# Patient Record
Sex: FEMALE | Race: OTHER | Employment: FULL TIME | URBAN - METROPOLITAN AREA
[De-identification: names, ages, dates, MRNs, and addresses within clinical notes are randomized per-mention and may not be internally consistent; named-entity substitution may affect disease eponyms.]

---

## 2017-09-13 PROBLEM — T74.22XA VICTIM OF CHILD MOLESTATION: Status: RESOLVED | Noted: 2017-09-13 | Resolved: 2017-09-13

## 2017-09-13 PROBLEM — T74.22XA VICTIM OF CHILD MOLESTATION: Status: ACTIVE | Noted: 2017-09-13

## 2020-12-23 PROBLEM — Z86.59 HISTORY OF BULIMIA: Status: ACTIVE | Noted: 2020-12-23

## 2020-12-23 PROBLEM — R87.619 ABNORMAL PAP SMEAR OF CERVIX: Status: ACTIVE | Noted: 2020-12-23

## 2020-12-23 PROBLEM — Z34.90 PREGNANCY: Status: ACTIVE | Noted: 2020-12-23

## 2020-12-23 PROBLEM — Z87.59 HISTORY OF DELIVERY OF MACROSOMAL INFANT: Status: ACTIVE | Noted: 2020-12-23

## 2020-12-23 PROBLEM — O98.319 GENITAL HERPES AFFECTING PREGNANCY: Status: ACTIVE | Noted: 2020-12-23

## 2020-12-23 PROBLEM — F32.A ANXIETY AND DEPRESSION: Status: ACTIVE | Noted: 2020-12-23

## 2020-12-23 PROBLEM — A60.09 GENITAL HERPES AFFECTING PREGNANCY: Status: ACTIVE | Noted: 2020-12-23

## 2020-12-23 PROBLEM — F41.9 ANXIETY AND DEPRESSION: Status: ACTIVE | Noted: 2020-12-23

## 2020-12-28 PROBLEM — O26.899 RH NEGATIVE STATE IN ANTEPARTUM PERIOD: Status: ACTIVE | Noted: 2020-12-28

## 2020-12-28 PROBLEM — Z67.91 RH NEGATIVE STATE IN ANTEPARTUM PERIOD: Status: ACTIVE | Noted: 2020-12-28

## 2021-03-18 PROBLEM — D21.9 FIBROID: Status: ACTIVE | Noted: 2021-03-18

## 2021-05-18 ENCOUNTER — HOSPITAL ENCOUNTER (OUTPATIENT)
Age: 36
Discharge: HOME | End: 2021-05-18
Attending: OBSTETRICS & GYNECOLOGY | Admitting: OBSTETRICS & GYNECOLOGY
Payer: COMMERCIAL

## 2021-05-18 VITALS
HEIGHT: 65 IN | TEMPERATURE: 97.6 F | HEART RATE: 75 BPM | OXYGEN SATURATION: 99 % | DIASTOLIC BLOOD PRESSURE: 76 MMHG | RESPIRATION RATE: 20 BRPM | BODY MASS INDEX: 38.15 KG/M2 | SYSTOLIC BLOOD PRESSURE: 133 MMHG | WEIGHT: 229 LBS

## 2021-05-18 PROBLEM — R10.9 ABDOMINAL PAIN DURING PREGNANCY IN THIRD TRIMESTER: Status: ACTIVE | Noted: 2021-05-18

## 2021-05-18 PROBLEM — O26.893 ABDOMINAL PAIN DURING PREGNANCY IN THIRD TRIMESTER: Status: ACTIVE | Noted: 2021-05-18

## 2021-05-18 LAB
ALBUMIN SERPL-MCNC: 2.9 G/DL (ref 3.5–5)
ALBUMIN/GLOB SERPL: 0.8 {RATIO} (ref 1.2–3.5)
ALP SERPL-CCNC: 90 U/L (ref 50–136)
ALT SERPL-CCNC: 16 U/L (ref 12–65)
AMYLASE SERPL-CCNC: 77 U/L (ref 25–115)
ANION GAP SERPL CALC-SCNC: 6 MMOL/L (ref 7–16)
AST SERPL-CCNC: 13 U/L (ref 15–37)
BASOPHILS # BLD: 0 K/UL (ref 0–0.2)
BASOPHILS NFR BLD: 0 % (ref 0–2)
BILIRUB SERPL-MCNC: 0.1 MG/DL (ref 0.2–1.1)
BUN SERPL-MCNC: 8 MG/DL (ref 6–23)
CALCIUM SERPL-MCNC: 8.6 MG/DL (ref 8.3–10.4)
CHLORIDE SERPL-SCNC: 107 MMOL/L (ref 98–107)
CO2 SERPL-SCNC: 24 MMOL/L (ref 21–32)
CREAT SERPL-MCNC: 0.34 MG/DL (ref 0.6–1)
DIFFERENTIAL METHOD BLD: ABNORMAL
EOSINOPHIL # BLD: 0 K/UL (ref 0–0.8)
EOSINOPHIL NFR BLD: 0 % (ref 0.5–7.8)
ERYTHROCYTE [DISTWIDTH] IN BLOOD BY AUTOMATED COUNT: 12.7 % (ref 11.9–14.6)
GLOBULIN SER CALC-MCNC: 3.7 G/DL (ref 2.3–3.5)
GLUCOSE SERPL-MCNC: 84 MG/DL (ref 65–100)
HCT VFR BLD AUTO: 34.2 % (ref 35.8–46.3)
HGB BLD-MCNC: 11.2 G/DL (ref 11.7–15.4)
IMM GRANULOCYTES # BLD AUTO: 0.1 K/UL (ref 0–0.5)
IMM GRANULOCYTES NFR BLD AUTO: 1 % (ref 0–5)
LDH SERPL L TO P-CCNC: 189 U/L (ref 100–190)
LYMPHOCYTES # BLD: 1.6 K/UL (ref 0.5–4.6)
LYMPHOCYTES NFR BLD: 22 % (ref 13–44)
MCH RBC QN AUTO: 31 PG (ref 26.1–32.9)
MCHC RBC AUTO-ENTMCNC: 32.7 G/DL (ref 31.4–35)
MCV RBC AUTO: 94.7 FL (ref 79.6–97.8)
MONOCYTES # BLD: 0.4 K/UL (ref 0.1–1.3)
MONOCYTES NFR BLD: 6 % (ref 4–12)
NEUTS SEG # BLD: 5.3 K/UL (ref 1.7–8.2)
NEUTS SEG NFR BLD: 72 % (ref 43–78)
NRBC # BLD: 0 K/UL (ref 0–0.2)
PLATELET # BLD AUTO: 199 K/UL (ref 150–450)
PMV BLD AUTO: 10.5 FL (ref 9.4–12.3)
POTASSIUM SERPL-SCNC: 4.4 MMOL/L (ref 3.5–5.1)
PROT SERPL-MCNC: 6.6 G/DL (ref 6.3–8.2)
RBC # BLD AUTO: 3.61 M/UL (ref 4.05–5.2)
SODIUM SERPL-SCNC: 137 MMOL/L (ref 136–145)
URATE SERPL-MCNC: 3.2 MG/DL (ref 2.6–6)
WBC # BLD AUTO: 7.4 K/UL (ref 4.3–11.1)

## 2021-05-18 PROCEDURE — 80053 COMPREHEN METABOLIC PANEL: CPT

## 2021-05-18 PROCEDURE — 84550 ASSAY OF BLOOD/URIC ACID: CPT

## 2021-05-18 PROCEDURE — 82150 ASSAY OF AMYLASE: CPT

## 2021-05-18 PROCEDURE — 83615 LACTATE (LD) (LDH) ENZYME: CPT

## 2021-05-18 PROCEDURE — 85025 COMPLETE CBC W/AUTO DIFF WBC: CPT

## 2021-05-18 PROCEDURE — 99282 EMERGENCY DEPT VISIT SF MDM: CPT

## 2021-05-18 RX ORDER — ONDANSETRON 4 MG/1
4 TABLET, ORALLY DISINTEGRATING ORAL
Status: DISCONTINUED | OUTPATIENT
Start: 2021-05-18 | End: 2021-05-19 | Stop reason: HOSPADM
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
## 2021-05-19 NOTE — PROGRESS NOTES
Patient discharged home self care. Patient verbalizes understanding of discharge instructions. Patient ambulated out.

## 2021-05-19 NOTE — DISCHARGE INSTRUCTIONS
Patient Education        Belly Pain in Pregnancy: Care Instructions  Overview     When you're pregnant, any belly pain can be a worry. You may not want to call your doctor or midwife about every pain you have. But you don't want to miss something that is dangerous for you or your baby. Even if it feels familiar, belly pain can mean something new when you're pregnant. It's important to know when to call your doctor or midwife. It will also help to know how to care for yourself at home when your pain is not caused by anything harmful. · When belly pain is more severe or constant, see a doctor or midwife right away. · If you're sure your belly pain is a sign of labor, call your doctor or midwife. · When belly pain is brief, it's usually a normal part of pregnancy. It might be related to changes in the growing uterus. Or it could be the stretching of ligaments called round ligaments. These ligaments help support the uterus. Round ligament pain can be on either side of your belly. It can also be felt in your hips or groin. Follow-up care is a key part of your treatment and safety. Be sure to make and go to all appointments, and call your doctor if you are having problems. It's also a good idea to know your test results and keep a list of the medicines you take. How can you tell if belly pain is a sign of labor? When belly pain is caused by labor, it can feel like mild or menstrual-like cramps in your lower belly. These cramps are probably contractions. They can happen in your second or third trimester. You may also have:  · A steady, dull ache in your lower back, pelvis, or thighs. · A feeling of pressure in your pelvis or lower belly. · Changes in your vaginal discharge or a sudden release of fluid from the vagina. If you think you are in labor, call your doctor. How can you care for yourself at home? When belly pain is mild and is not a symptom of labor:  · Rest until you feel better.   · Take a warm bath.  · Think about what you drink and eat:  ? Drink plenty of fluids. Choose water and other caffeine-free clear liquids until you feel better. ? Try eating small, frequent meals. If your stomach is upset, try bland, low-fat foods like plain rice, broiled chicken, toast, and yogurt. · Think about how you move if you are having brief pains from stretching of the round ligaments. ? Try gentle stretching. ? Move a little more slowly when turning in bed or getting up from a chair, so those ligaments don't stretch quickly. ? Lean forward a bit if you think you are going to cough or sneeze. When should you call for help? Call 911 anytime you think you may need emergency care. For example, call if:    · You have sudden, severe pain in your belly.     · You have severe vaginal bleeding.     · You passed out (lost consciousness).     · You have a seizure. Call your doctor now or seek immediate medical care if:    · You have new or worse belly pain or cramping.     · You have any vaginal bleeding.     · You have a fever.     · You have symptoms of preeclampsia, such as:  ? Sudden swelling of your face, hands, or feet. ? New vision problems (such as dimness, blurring, or seeing spots). ? A severe headache.     · You think that you may be in labor. This means that you've had at least 8 contractions within 1 hour or at least 4 contractions within 20 minutes, even after you change your position and drink fluids.     · You have symptoms of a urinary tract infection. These may include:  ? Pain or burning when you urinate. ? A frequent need to urinate without being able to pass much urine. ? Pain in the flank, which is just below the rib cage and above the waist on either side of the back. ? Blood in your urine. Watch closely for changes in your health, and be sure to contact your doctor if you are worried about your or your baby's health. Where can you learn more?   Go to http://www.gray.com/  Enter B275 in the search box to learn more about \"Belly Pain in Pregnancy: Care Instructions. \"  Current as of: October 8, 2020               Content Version: 12.8  © 2006-2021 [employer]. Care instructions adapted under license by [employer] (which disclaims liability or warranty for this information). If you have questions about a medical condition or this instruction, always ask your healthcare professional. [address] any warranty or liability for your use of this information.

## 2021-05-27 PROBLEM — U07.1 COVID-19: Status: ACTIVE | Noted: 2021-05-27

## 2021-06-04 PROBLEM — O98.513 COVID-19 AFFECTING PREGNANCY IN THIRD TRIMESTER: Status: ACTIVE | Noted: 2021-05-27

## 2021-06-04 PROBLEM — O09.93 HIGH-RISK PREGNANCY IN THIRD TRIMESTER: Status: ACTIVE | Noted: 2020-12-23

## 2021-06-04 PROBLEM — O26.893 ABDOMINAL PAIN DURING PREGNANCY IN THIRD TRIMESTER: Status: RESOLVED | Noted: 2021-05-18 | Resolved: 2021-06-04

## 2021-06-04 PROBLEM — O99.213 OBESITY AFFECTING PREGNANCY IN THIRD TRIMESTER: Status: ACTIVE | Noted: 2021-06-04

## 2021-06-04 PROBLEM — R10.9 ABDOMINAL PAIN DURING PREGNANCY IN THIRD TRIMESTER: Status: RESOLVED | Noted: 2021-05-18 | Resolved: 2021-06-04

## 2021-06-04 PROBLEM — O99.343 ANXIETY DURING PREGNANCY, ANTEPARTUM, THIRD TRIMESTER: Status: ACTIVE | Noted: 2020-12-23

## 2021-06-04 PROBLEM — O09.893 HIGH RISK TEEN PREGNANCY IN THIRD TRIMESTER: Status: ACTIVE | Noted: 2020-12-23

## 2021-06-04 PROBLEM — Z3A.31 31 WEEKS GESTATION OF PREGNANCY: Status: ACTIVE | Noted: 2021-06-04

## 2021-06-29 PROBLEM — Z3A.31 31 WEEKS GESTATION OF PREGNANCY: Status: RESOLVED | Noted: 2021-06-04 | Resolved: 2021-06-29

## 2021-07-25 ENCOUNTER — ANESTHESIA EVENT (OUTPATIENT)
Dept: LABOR AND DELIVERY | Age: 36
End: 2021-07-25
Payer: COMMERCIAL

## 2021-07-26 ENCOUNTER — HOSPITAL ENCOUNTER (INPATIENT)
Age: 36
LOS: 2 days | Discharge: HOME | End: 2021-07-28
Attending: OBSTETRICS & GYNECOLOGY | Admitting: OBSTETRICS & GYNECOLOGY
Payer: COMMERCIAL

## 2021-07-26 ENCOUNTER — ANESTHESIA (OUTPATIENT)
Dept: LABOR AND DELIVERY | Age: 36
End: 2021-07-26
Payer: COMMERCIAL

## 2021-07-26 DIAGNOSIS — A60.09 GENITAL HERPES AFFECTING PREGNANCY IN THIRD TRIMESTER: ICD-10-CM

## 2021-07-26 DIAGNOSIS — Z3A.39 39 WEEKS GESTATION OF PREGNANCY: Primary | ICD-10-CM

## 2021-07-26 DIAGNOSIS — O98.313 GENITAL HERPES AFFECTING PREGNANCY IN THIRD TRIMESTER: ICD-10-CM

## 2021-07-26 LAB
ABO + RH BLD: NORMAL
BASE EXCESS BLD CALC-SCNC: 0.3 MMOL/L
BASE EXCESS BLD CALC-SCNC: 0.6 MMOL/L
BLOOD GROUP ANTIBODIES SERPL: NORMAL
ERYTHROCYTE [DISTWIDTH] IN BLOOD BY AUTOMATED COUNT: 12.7 % (ref 11.9–14.6)
HCO3 BLD-SCNC: 26.3 MMOL/L (ref 22–26)
HCO3 BLDV-SCNC: 24.2 MMOL/L (ref 23–28)
HCT VFR BLD AUTO: 36.8 % (ref 35.8–46.3)
HGB BLD-MCNC: 12.5 G/DL (ref 11.7–15.4)
MCH RBC QN AUTO: 30.9 PG (ref 26.1–32.9)
MCHC RBC AUTO-ENTMCNC: 34 G/DL (ref 31.4–35)
MCV RBC AUTO: 91.1 FL (ref 79.6–97.8)
NRBC # BLD: 0 K/UL (ref 0–0.2)
PCO2 BLDCO: 36 MMHG (ref 32–68)
PCO2 BLDCO: 45 MMHG (ref 32–68)
PH BLDCO: 7.37 [PH] (ref 7.15–7.38)
PH BLDCO: 7.44 [PH] (ref 7.15–7.38)
PLATELET # BLD AUTO: 197 K/UL (ref 150–450)
PMV BLD AUTO: 10.7 FL (ref 9.4–12.3)
PO2 BLDCO: 13 MMHG
PO2 BLDCO: 24 MMHG
RBC # BLD AUTO: 4.04 M/UL (ref 4.05–5.2)
SAO2 % BLD: 13.3 % (ref 95–98)
SAO2 % BLDV: 44 % (ref 65–88)
SERVICE CMNT-IMP: ABNORMAL
SERVICE CMNT-IMP: ABNORMAL
SPECIMEN EXP DATE BLD: NORMAL
SPECIMEN TYPE: ABNORMAL
SPECIMEN TYPE: ABNORMAL
WBC # BLD AUTO: 6.8 K/UL (ref 4.3–11.1)

## 2021-07-26 PROCEDURE — 74011250637 HC RX REV CODE- 250/637: Performed by: STUDENT IN AN ORGANIZED HEALTH CARE EDUCATION/TRAINING PROGRAM

## 2021-07-26 PROCEDURE — 74011250636 HC RX REV CODE- 250/636: Performed by: PHYSICIAN ASSISTANT

## 2021-07-26 PROCEDURE — 74011250636 HC RX REV CODE- 250/636: Performed by: OBSTETRICS & GYNECOLOGY

## 2021-07-26 PROCEDURE — 74011250636 HC RX REV CODE- 250/636: Performed by: STUDENT IN AN ORGANIZED HEALTH CARE EDUCATION/TRAINING PROGRAM

## 2021-07-26 PROCEDURE — 76060000078 HC EPIDURAL ANESTHESIA: Performed by: OBSTETRICS & GYNECOLOGY

## 2021-07-26 PROCEDURE — 74011000250 HC RX REV CODE- 250: Performed by: PHYSICIAN ASSISTANT

## 2021-07-26 PROCEDURE — 77030034696 HC CATH URETH FOL 2W BARD -A: Performed by: OBSTETRICS & GYNECOLOGY

## 2021-07-26 PROCEDURE — 76010000391 HC C SECN FIRST 1 HR: Performed by: OBSTETRICS & GYNECOLOGY

## 2021-07-26 PROCEDURE — 77030032490 HC SLV COMPR SCD KNE COVD -B: Performed by: OBSTETRICS & GYNECOLOGY

## 2021-07-26 PROCEDURE — 75410000003 HC RECOV DEL/VAG/CSECN EA 0.5 HR: Performed by: OBSTETRICS & GYNECOLOGY

## 2021-07-26 PROCEDURE — 77030007880 HC KT SPN EPDRL BBMI -B: Performed by: NURSE ANESTHETIST, CERTIFIED REGISTERED

## 2021-07-26 PROCEDURE — 85027 COMPLETE CBC AUTOMATED: CPT

## 2021-07-26 PROCEDURE — 77030002888 HC SUT CHRMC J&J -A: Performed by: OBSTETRICS & GYNECOLOGY

## 2021-07-26 PROCEDURE — 74011000250 HC RX REV CODE- 250: Performed by: STUDENT IN AN ORGANIZED HEALTH CARE EDUCATION/TRAINING PROGRAM

## 2021-07-26 PROCEDURE — 2709999900 HC NON-CHARGEABLE SUPPLY: Performed by: OBSTETRICS & GYNECOLOGY

## 2021-07-26 PROCEDURE — 82803 BLOOD GASES ANY COMBINATION: CPT

## 2021-07-26 PROCEDURE — 65270000029 HC RM PRIVATE

## 2021-07-26 PROCEDURE — 74011000250 HC RX REV CODE- 250: Performed by: NURSE ANESTHETIST, CERTIFIED REGISTERED

## 2021-07-26 PROCEDURE — 77030003665 HC NDL SPN BBMI -A: Performed by: NURSE ANESTHETIST, CERTIFIED REGISTERED

## 2021-07-26 PROCEDURE — 59510 CESAREAN DELIVERY: CPT | Performed by: OBSTETRICS & GYNECOLOGY

## 2021-07-26 PROCEDURE — 74011250636 HC RX REV CODE- 250/636: Performed by: NURSE ANESTHETIST, CERTIFIED REGISTERED

## 2021-07-26 PROCEDURE — 86901 BLOOD TYPING SEROLOGIC RH(D): CPT

## 2021-07-26 PROCEDURE — 77030031139 HC SUT VCRL2 J&J -A: Performed by: OBSTETRICS & GYNECOLOGY

## 2021-07-26 PROCEDURE — 2709999900 HC NON-CHARGEABLE SUPPLY

## 2021-07-26 RX ORDER — DIPHENHYDRAMINE HYDROCHLORIDE 50 MG/ML
12.5 INJECTION, SOLUTION INTRAMUSCULAR; INTRAVENOUS
Status: DISCONTINUED | OUTPATIENT
Start: 2021-07-26 | End: 2021-07-27

## 2021-07-26 RX ORDER — SIMETHICONE 80 MG
80 TABLET,CHEWABLE ORAL
Status: DISCONTINUED | OUTPATIENT
Start: 2021-07-26 | End: 2021-07-28 | Stop reason: HOSPADM

## 2021-07-26 RX ORDER — SODIUM CHLORIDE, SODIUM LACTATE, POTASSIUM CHLORIDE, CALCIUM CHLORIDE 600; 310; 30; 20 MG/100ML; MG/100ML; MG/100ML; MG/100ML
150 INJECTION, SOLUTION INTRAVENOUS CONTINUOUS
Status: ACTIVE | OUTPATIENT
Start: 2021-07-26 | End: 2021-07-27

## 2021-07-26 RX ORDER — OXYTOCIN/RINGER'S LACTATE 30/500 ML
PLASTIC BAG, INJECTION (ML) INTRAVENOUS
Status: DISCONTINUED | OUTPATIENT
Start: 2021-07-26 | End: 2021-07-26 | Stop reason: HOSPADM

## 2021-07-26 RX ORDER — SODIUM CHLORIDE, SODIUM LACTATE, POTASSIUM CHLORIDE, CALCIUM CHLORIDE 600; 310; 30; 20 MG/100ML; MG/100ML; MG/100ML; MG/100ML
50 INJECTION, SOLUTION INTRAVENOUS CONTINUOUS
Status: DISCONTINUED | OUTPATIENT
Start: 2021-07-26 | End: 2021-07-27

## 2021-07-26 RX ORDER — NALOXONE HYDROCHLORIDE 0.4 MG/ML
0.2 INJECTION, SOLUTION INTRAMUSCULAR; INTRAVENOUS; SUBCUTANEOUS
Status: DISCONTINUED | OUTPATIENT
Start: 2021-07-26 | End: 2021-07-27

## 2021-07-26 RX ORDER — SODIUM CHLORIDE 0.9 % (FLUSH) 0.9 %
5-40 SYRINGE (ML) INJECTION AS NEEDED
Status: DISCONTINUED | OUTPATIENT
Start: 2021-07-26 | End: 2021-07-26 | Stop reason: HOSPADM

## 2021-07-26 RX ORDER — HYDROMORPHONE HYDROCHLORIDE 1 MG/ML
0.5 INJECTION, SOLUTION INTRAMUSCULAR; INTRAVENOUS; SUBCUTANEOUS
Status: DISCONTINUED | OUTPATIENT
Start: 2021-07-26 | End: 2021-07-27

## 2021-07-26 RX ORDER — KETOROLAC TROMETHAMINE 30 MG/ML
30 INJECTION, SOLUTION INTRAMUSCULAR; INTRAVENOUS
Status: DISCONTINUED | OUTPATIENT
Start: 2021-07-26 | End: 2021-07-27

## 2021-07-26 RX ORDER — ONDANSETRON 2 MG/ML
INJECTION INTRAMUSCULAR; INTRAVENOUS AS NEEDED
Status: DISCONTINUED | OUTPATIENT
Start: 2021-07-26 | End: 2021-07-26 | Stop reason: HOSPADM

## 2021-07-26 RX ORDER — SODIUM CHLORIDE, SODIUM LACTATE, POTASSIUM CHLORIDE, CALCIUM CHLORIDE 600; 310; 30; 20 MG/100ML; MG/100ML; MG/100ML; MG/100ML
INJECTION, SOLUTION INTRAVENOUS
Status: DISCONTINUED | OUTPATIENT
Start: 2021-07-26 | End: 2021-07-26 | Stop reason: HOSPADM

## 2021-07-26 RX ORDER — VALACYCLOVIR HYDROCHLORIDE 500 MG/1
500 TABLET, FILM COATED ORAL DAILY
Status: DISCONTINUED | OUTPATIENT
Start: 2021-07-27 | End: 2021-07-28 | Stop reason: HOSPADM

## 2021-07-26 RX ORDER — FAMOTIDINE 20 MG/1
20 TABLET, FILM COATED ORAL ONCE
Status: COMPLETED | OUTPATIENT
Start: 2021-07-26 | End: 2021-07-26

## 2021-07-26 RX ORDER — BUPIVACAINE HYDROCHLORIDE 7.5 MG/ML
INJECTION, SOLUTION INTRASPINAL
Status: COMPLETED | OUTPATIENT
Start: 2021-07-26 | End: 2021-07-26

## 2021-07-26 RX ORDER — TRISODIUM CITRATE DIHYDRATE AND CITRIC ACID MONOHYDRATE 500; 334 MG/5ML; MG/5ML
30 SOLUTION ORAL ONCE
Status: COMPLETED | OUTPATIENT
Start: 2021-07-26 | End: 2021-07-26

## 2021-07-26 RX ORDER — MORPHINE SULFATE 0.5 MG/ML
INJECTION, SOLUTION EPIDURAL; INTRATHECAL; INTRAVENOUS
Status: COMPLETED | OUTPATIENT
Start: 2021-07-26 | End: 2021-07-26

## 2021-07-26 RX ORDER — ONDANSETRON 2 MG/ML
4 INJECTION INTRAMUSCULAR; INTRAVENOUS ONCE
Status: COMPLETED | OUTPATIENT
Start: 2021-07-26 | End: 2021-07-26

## 2021-07-26 RX ORDER — SODIUM CHLORIDE 0.9 % (FLUSH) 0.9 %
5-40 SYRINGE (ML) INJECTION AS NEEDED
Status: DISCONTINUED | OUTPATIENT
Start: 2021-07-26 | End: 2021-07-28 | Stop reason: HOSPADM

## 2021-07-26 RX ORDER — SODIUM CHLORIDE, SODIUM LACTATE, POTASSIUM CHLORIDE, CALCIUM CHLORIDE 600; 310; 30; 20 MG/100ML; MG/100ML; MG/100ML; MG/100ML
50 INJECTION, SOLUTION INTRAVENOUS CONTINUOUS
Status: DISCONTINUED | OUTPATIENT
Start: 2021-07-26 | End: 2021-07-26 | Stop reason: HOSPADM

## 2021-07-26 RX ORDER — EPHEDRINE SULFATE/0.9% NACL/PF 50 MG/5 ML
SYRINGE (ML) INTRAVENOUS AS NEEDED
Status: DISCONTINUED | OUTPATIENT
Start: 2021-07-26 | End: 2021-07-26 | Stop reason: HOSPADM

## 2021-07-26 RX ORDER — KETOROLAC TROMETHAMINE 30 MG/ML
INJECTION, SOLUTION INTRAMUSCULAR; INTRAVENOUS AS NEEDED
Status: DISCONTINUED | OUTPATIENT
Start: 2021-07-26 | End: 2021-07-26 | Stop reason: HOSPADM

## 2021-07-26 RX ORDER — OXYCODONE HYDROCHLORIDE 5 MG/1
10 TABLET ORAL
Status: DISCONTINUED | OUTPATIENT
Start: 2021-07-26 | End: 2021-07-27

## 2021-07-26 RX ORDER — SERTRALINE HYDROCHLORIDE 50 MG/1
100 TABLET, FILM COATED ORAL DAILY
Status: DISCONTINUED | OUTPATIENT
Start: 2021-07-27 | End: 2021-07-28 | Stop reason: HOSPADM

## 2021-07-26 RX ORDER — SODIUM CHLORIDE 0.9 % (FLUSH) 0.9 %
5-40 SYRINGE (ML) INJECTION EVERY 8 HOURS
Status: DISCONTINUED | OUTPATIENT
Start: 2021-07-26 | End: 2021-07-27

## 2021-07-26 RX ORDER — CEFAZOLIN SODIUM/WATER 2 G/20 ML
2 SYRINGE (ML) INTRAVENOUS ONCE
Status: COMPLETED | OUTPATIENT
Start: 2021-07-26 | End: 2021-07-26

## 2021-07-26 RX ORDER — ACETAMINOPHEN 325 MG/1
650 TABLET ORAL EVERY 6 HOURS
Status: DISCONTINUED | OUTPATIENT
Start: 2021-07-26 | End: 2021-07-27

## 2021-07-26 RX ORDER — OXYTOCIN/RINGER'S LACTATE 30/500 ML
87.3 PLASTIC BAG, INJECTION (ML) INTRAVENOUS AS NEEDED
Status: DISCONTINUED | OUTPATIENT
Start: 2021-07-26 | End: 2021-07-26 | Stop reason: HOSPADM

## 2021-07-26 RX ORDER — DEXTROSE, SODIUM CHLORIDE, SODIUM LACTATE, POTASSIUM CHLORIDE, AND CALCIUM CHLORIDE 5; .6; .31; .03; .02 G/100ML; G/100ML; G/100ML; G/100ML; G/100ML
125 INJECTION, SOLUTION INTRAVENOUS CONTINUOUS
Status: DISCONTINUED | OUTPATIENT
Start: 2021-07-26 | End: 2021-07-26 | Stop reason: HOSPADM

## 2021-07-26 RX ORDER — SODIUM CHLORIDE 0.9 % (FLUSH) 0.9 %
5-40 SYRINGE (ML) INJECTION EVERY 8 HOURS
Status: DISCONTINUED | OUTPATIENT
Start: 2021-07-26 | End: 2021-07-26 | Stop reason: HOSPADM

## 2021-07-26 RX ORDER — OXYTOCIN/RINGER'S LACTATE 30/500 ML
10 PLASTIC BAG, INJECTION (ML) INTRAVENOUS AS NEEDED
Status: DISCONTINUED | OUTPATIENT
Start: 2021-07-26 | End: 2021-07-26 | Stop reason: HOSPADM

## 2021-07-26 RX ORDER — DOCUSATE SODIUM 100 MG/1
100 CAPSULE, LIQUID FILLED ORAL 2 TIMES DAILY
Status: DISCONTINUED | OUTPATIENT
Start: 2021-07-26 | End: 2021-07-28 | Stop reason: HOSPADM

## 2021-07-26 RX ADMIN — PHENYLEPHRINE HYDROCHLORIDE 100 MCG: 10 INJECTION INTRAVENOUS at 11:57

## 2021-07-26 RX ADMIN — BUPIVACAINE HYDROCHLORIDE IN DEXTROSE 12 MG: 7.5 INJECTION, SOLUTION SUBARACHNOID at 11:46

## 2021-07-26 RX ADMIN — MORPHINE SULFATE 0.15 MG: 0.5 INJECTION, SOLUTION EPIDURAL; INTRATHECAL; INTRAVENOUS at 11:46

## 2021-07-26 RX ADMIN — ONDANSETRON 4 MG: 2 INJECTION INTRAMUSCULAR; INTRAVENOUS at 12:02

## 2021-07-26 RX ADMIN — OXYTOCIN 500 ML/HR: 10 INJECTION, SOLUTION INTRAMUSCULAR; INTRAVENOUS at 12:02

## 2021-07-26 RX ADMIN — FAMOTIDINE 20 MG: 20 TABLET ORAL at 09:47

## 2021-07-26 RX ADMIN — SODIUM CITRATE AND CITRIC ACID MONOHYDRATE 30 ML: 500; 334 SOLUTION ORAL at 09:48

## 2021-07-26 RX ADMIN — Medication 10 MG: at 11:50

## 2021-07-26 RX ADMIN — PHENYLEPHRINE HYDROCHLORIDE 100 MCG: 10 INJECTION INTRAVENOUS at 11:50

## 2021-07-26 RX ADMIN — PROMETHAZINE HYDROCHLORIDE 12.5 MG: 25 INJECTION INTRAMUSCULAR; INTRAVENOUS at 15:03

## 2021-07-26 RX ADMIN — SODIUM CHLORIDE, SODIUM LACTATE, POTASSIUM CHLORIDE, AND CALCIUM CHLORIDE 50 ML/HR: 600; 310; 30; 20 INJECTION, SOLUTION INTRAVENOUS at 08:45

## 2021-07-26 RX ADMIN — HYDROMORPHONE HYDROCHLORIDE 0.5 MG: 1 INJECTION, SOLUTION INTRAMUSCULAR; INTRAVENOUS; SUBCUTANEOUS at 13:47

## 2021-07-26 RX ADMIN — SODIUM CHLORIDE, SODIUM LACTATE, POTASSIUM CHLORIDE, AND CALCIUM CHLORIDE 50 ML/HR: 600; 310; 30; 20 INJECTION, SOLUTION INTRAVENOUS at 11:35

## 2021-07-26 RX ADMIN — ACETAMINOPHEN 650 MG: 325 TABLET, FILM COATED ORAL at 21:31

## 2021-07-26 RX ADMIN — PROMETHAZINE HYDROCHLORIDE 6.25 MG: 25 INJECTION INTRAMUSCULAR; INTRAVENOUS at 16:52

## 2021-07-26 RX ADMIN — PROMETHAZINE HYDROCHLORIDE 6.25 MG: 25 INJECTION INTRAMUSCULAR; INTRAVENOUS at 16:34

## 2021-07-26 RX ADMIN — ONDANSETRON 4 MG: 2 INJECTION INTRAMUSCULAR; INTRAVENOUS at 13:47

## 2021-07-26 RX ADMIN — HYDROMORPHONE HYDROCHLORIDE 0.5 MG: 1 INJECTION, SOLUTION INTRAMUSCULAR; INTRAVENOUS; SUBCUTANEOUS at 13:38

## 2021-07-26 RX ADMIN — PHENYLEPHRINE HYDROCHLORIDE 50 MCG: 10 INJECTION INTRAVENOUS at 11:48

## 2021-07-26 RX ADMIN — KETOROLAC TROMETHAMINE 30 MG: 30 INJECTION, SOLUTION INTRAMUSCULAR; INTRAVENOUS at 12:16

## 2021-07-26 RX ADMIN — SODIUM CHLORIDE, SODIUM LACTATE, POTASSIUM CHLORIDE, AND CALCIUM CHLORIDE 50 ML/HR: 600; 310; 30; 20 INJECTION, SOLUTION INTRAVENOUS at 13:52

## 2021-07-26 RX ADMIN — KETOROLAC TROMETHAMINE 30 MG: 30 INJECTION, SOLUTION INTRAMUSCULAR; INTRAVENOUS at 18:33

## 2021-07-26 RX ADMIN — Medication 10 MG: at 11:48

## 2021-07-26 RX ADMIN — CEFAZOLIN 2 G: 1 INJECTION, POWDER, FOR SOLUTION INTRAVENOUS at 09:00

## 2021-07-26 RX ADMIN — SODIUM CHLORIDE, SODIUM LACTATE, POTASSIUM CHLORIDE, AND CALCIUM CHLORIDE: 600; 310; 30; 20 INJECTION, SOLUTION INTRAVENOUS at 11:38

## 2021-07-26 NOTE — LACTATION NOTE
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
## 2021-07-26 NOTE — OP NOTES
Yuridia Garrett  [id number]      INTRAUTERINE PREGNANCY  SECTION FULL OP NOTE        DATE OF PROCEDURE:  2021    PREOPERATIVE DIAGNOSIS:  Herpes simplex outbreak, active, 39 0/7 weeks estimated gestational age    POSTOPERATIVE DIAGNOSIS:  same with 6cm posterior lower uterine segment fibroid    ADDITIONAL DIAGNOSES: viable male infant    PROCEDURE: Low transverse  section    SURGEON:  Carl Moreno MD    ASSISTANT:  none    ANESTHESIA: Spinal    EBL: 101 cc    COMPLICATIONS: none    OPERATIVE PROCEDURE: Patient was placed on the operating room table in the supine position/left lateral tilt. Time out was done to confirm the operating procedure, surgeon, patient and site. Once confirmed by the team, procedure was started. After having adequate regional anesthesia by spinal injection, the patient was prepped and draped in the usual fashion for abdominal surgery. A Pfannenstiel incision was made and carried down sharply through the skin, subcutaneous tissue, and fascia. Fascia was sharply dissected free from underlying rectus muscles. The peritoneum was sharply entered and extended vertically. DeLee bladder blade was then placed over the bladder. The visceroperitoneal reflection over the lower uterine segment was incised transversely and the bladder flap sharply and bluntly developed. The uterus was incised transversely, then extended bluntly, and the infants head was delivered without difficulty and fundal pressure. Fluid was clear. Cord was clamped and cut. Mouth and nose were suctioned clean. The infant was given to the NICU personnel present at the time of delivery. The placenta was expressed, intact on inspection. The uterus was exteriorized, wrapped in a wet lap square, curetted with a dry square, and closed in a double-layered fashion with #1 chromic. Hemostasis appeared adequate. The cul-de-sac was then irrigated and suctioned clean. Fibroid noted in posterior lower uterine segment. The uterus was placed in the abdomen. The gutters were irrigated and suctioned clean. The peritoneum and rectus muscles were closed with 2-0 chromic. The fascia was closed with 0 vicryl. Running 2-0 plain was used to reapproximate the subcutaneous tissue. 4-0 Vicryl was used in a subcuticular stitch. The patient tolerated the procedure well and went to the recovery room in satisfactory condition.

## 2021-07-26 NOTE — ANESTHESIA PROCEDURE NOTES
Spinal Block    Start time: 7/26/2021 11:43 AM  End time: 7/26/2021 11:46 AM  Performed by: Sarah Henning MD  Authorized by: Sarah Henning MD     Pre-procedure:   Indications: primary anesthetic  Preanesthetic Checklist: patient identified, risks and benefits discussed, anesthesia consent, site marked, patient being monitored and timeout performed    Timeout Time: 11:43 EDT          Spinal Block:   Patient Position:  Seated  Prep Region:  Lumbar  Prep: chlorhexidine      Location:  L4-5  Technique:  Single shot    Local Dose (mL):  1.6    Needle:   Needle Type:  Pencan  Needle Gauge:  25 G  Attempts:  1      Events: CSF confirmed, no blood with aspiration and no paresthesia        Assessment:  Insertion:  Uncomplicated  Patient tolerance:  Patient tolerated the procedure well with no immediate complications

## 2021-07-26 NOTE — ANESTHESIA PREPROCEDURE EVALUATION
Relevant Problems   NEUROLOGY   (+) Anxiety during pregnancy, antepartum, third trimester      ENDOCRINE   (+) Obesity affecting pregnancy in third trimester       Anesthetic History   No history of anesthetic complications            Review of Systems / Medical History  Patient summary reviewed, nursing notes reviewed and pertinent labs reviewed    Pulmonary  Within defined limits                 Neuro/Psych         Psychiatric history     Cardiovascular  Within defined limits                Exercise tolerance: >4 METS     GI/Hepatic/Renal  Within defined limits              Endo/Other        Morbid obesity     Other Findings   Comments: HSV 2.  C/S for current outbreak           Physical Exam    Airway  Mallampati: II  TM Distance: 4 - 6 cm  Neck ROM: normal range of motion   Mouth opening: Normal     Cardiovascular  Regular rate and rhythm,  S1 and S2 normal,  no murmur, click, rub, or gallop             Dental  No notable dental hx       Pulmonary  Breath sounds clear to auscultation               Abdominal         Other Findings            Anesthetic Plan    ASA: 2  Anesthesia type: spinal      Post-op pain plan if not by surgeon: intrathecal opiates      Anesthetic plan and risks discussed with: Patient and Mother

## 2021-07-26 NOTE — PROGRESS NOTES
SBAR OUT Report: Mother    Verbal report given to Corrinne Bibles RN  on this patient, who is now being transferred to THE [facility] ([facility] for routine progression of care. The patient is not wearing a green \"Anesthesia-Duramorph\" band. Report consisted of patient's Situation, Background, Assessment and Recommendations (SBAR). [place] ID bands were compared with the identification form, and verified with the patient and receiving nurse. Information from the SBAR and the [address] [facility] Report was reviewed with the receiving nurse; opportunity for questions and clarification provided.

## 2021-07-26 NOTE — LACTATION NOTE
This note was copied from a baby's chart. In to see mom and infant for the first time. Experienced mom stated that she pumped with her first two children. She said that this infant latched and did nurse briefly. Mom very nauseous and has been vomiting. Reviewed the expectations of the first 24 hours. Set up pump at mom's bedside as she stated that she may pump. Informed he that the pump parts are hers to keep. Mom stated that she doesn't have a personal breast pump at home and will probably rent a pump at discharge. Lactation consultant will follow up tomorrow.

## 2021-07-26 NOTE — PROGRESS NOTES
Notified Dr. Moises Meadows of patient nausea and vomiting, notified that patient did get relief from phenergan that was given per order, give phenergan 6.25 mg IV now and may repeat phenergan 6.25mg  IV 20 minutes later if patient not to sleepy, read back

## 2021-07-26 NOTE — PROGRESS NOTES
Admission assessment complete as noted. Patient oriented to room and unit. Plan of care reviewed and patient verbalizes understanding. Questions encouraged and answered. Patent encouraged to call for needs or concerns. Safety Teaching reviewed with Mother:   1. Hand hygiene prior to handling the infant. 2. Use of bulb syringe. 3. Bracelets with matching numbers are placed on mother and infant  4. An infant security tag  [facility]) is placed on the infant's ankle and monitored  5. All OB nurses wear pink Employee badges - do not give your baby to anyone without proper identification. 6. Never leave the baby alone in the room. 7. The infant should be placed on their back to sleep. on a firm mattress. No toys should be placed in the crib. (safe sleep video offered to view)  8. Never shake the baby (video offered to view)  9. Infant fall prevention - do not sleep with the baby, and place the baby in the crib while ambulating. 8. Mother and Baby Care booklet given to Mother.

## 2021-07-26 NOTE — ANESTHESIA POSTPROCEDURE EVALUATION
Procedure(s):   SECTION.     spinal    Anesthesia Post Evaluation      Multimodal analgesia: multimodal analgesia used between 6 hours prior to anesthesia start to PACU discharge  Patient location during evaluation: bedside  Patient participation: complete - patient participated  Level of consciousness: awake and alert  Pain management: adequate  Airway patency: patent  Anesthetic complications: no  Cardiovascular status: acceptable  Respiratory status: acceptable  Hydration status: acceptable  Post anesthesia nausea and vomiting:  controlled  Final Post Anesthesia Temperature Assessment:  Normothermia (36.0-37.5 degrees C)      INITIAL Post-op Vital signs:   Vitals Value Taken Time   /75 21 1330   Temp 37 °C (98.6 °F) 21 1238   Pulse 58 21 1330   Resp 18 21 1330   SpO2 100 % 21 1330

## 2021-07-26 NOTE — L&D DELIVERY NOTE
Delivery Summary    Patient: Mayela Fajardo MRN: [id number]  SSN: [id number]    YOB: 1985  Age: 30 y.o. Sex: female        Information for the patient's :  Santi  [[id number]]       Labor Events:    Labor: No    Steroids:     Cervical Ripening Date/Time:       Cervical Ripening Type:     Antibiotics During Labor:     Rupture Identifier:      Rupture Date/Time: 2021 12:01 PM   Rupture Type:     Amniotic Fluid Volume: Moderate    Amniotic Fluid Description: Clear    Amniotic Fluid Odor:      Induction: None       Induction Date/Time:        Indications for Induction:      Augmentation: None   Augmentation Date/Time:      Indications for Augmentation:     Labor complications: None       Additional complications:        Delivery Events:  Indications For Episiotomy:     Episiotomy: None   Perineal Laceration(s):     Repaired:     Periurethral Laceration Location:      Repaired:     Labial Laceration Location:     Repaired:     Sulcal Laceration Location:     Repaired:     Vaginal Laceration Location:     Repaired:     Cervical Laceration Location:     Repaired:     Repair Suture:     Number of Repair Packets:     Estimated Blood Loss (ml):  ml   Quantitaive Blood Loss (ml):             Delivery Date: 2021    Delivery Time: 12:00 PM   Delivery Type: , Low Transverse     Details    Trial of Labor: No   Primary/Repeat: Primary   Priority: Routine   Indications: Other (Add Comments) active HSV outbreak     Sex:  Male     Gestational Age: 39w0d  Delivery Clinician:  Grazyna Sepulveda  Living Status: Living   Delivery Location: OR            APGARS  One minute Five minutes Ten minutes   Skin color: 0   1        Heart rate: 2   2        Grimace: 2   2        Muscle tone: 2   2        Breathin   2        Totals: 8   9          Presentation: Vertex    Position:        Resuscitation Method:  Suctioning-bulb; Tactile Stimulation Meconium Stained:        Cord Information: 3 Vessels  Complications: None;Nuchal Cord Without Compressions  Cord around: head  Delayed cord clamping? No  Cord clamped date/time:   Disposition of Cord Blood: Lab    Blood Gases Sent?: Yes    Placenta:  Date/Time: 2021 12:02 PM  Removal: Manual Removal      Appearance: Normal      Measurements:  Birth Weight:        Birth Length:        Head Circumference:        Chest Circumference:       Abdominal Girth:       Other Providers:   Amita FARRAR STEPHANIE;;;Caryn MARROQUIN DANIEL;KAI CISNEROS;;;Lani DELGADO ANGELA A;Hemanth MURO, Obstetrician;Primary Nurse;Primary [place] Nurse;Nicu Nurse;Neonatologist;Anesthesiologist;Crna;Nurse Practitioner;Midwife;Nursery Nurse;Respiratory Therapist;Pediatrician             Group B Strep:   Lab Results   Component Value Date/Time    Valentetreromeo, External positive 2009 12:00 AM     Information for the patient's :  Jameson Jimenez [[id number]]   No results found for: ABORH, PCTABR, PCTDIG, BILI, ABORHEXT, ABORH     Recent Labs     21  1212 21  1211   PCO2CB 36 45   PO2CB 24 13   HCO3I  --  26.3*   SO2I  --  13.3*   SPECTI VENOUS CORD ARTERIAL CORD   PHICB 7.44* 7.37

## 2021-07-27 LAB
BASOPHILS # BLD: 0 K/UL (ref 0–0.2)
BASOPHILS NFR BLD: 0 % (ref 0–2)
BLOOD BANK CMNT PATIENT-IMP: NORMAL
DIFFERENTIAL METHOD BLD: ABNORMAL
EOSINOPHIL # BLD: 0 K/UL (ref 0–0.8)
EOSINOPHIL NFR BLD: 0 % (ref 0.5–7.8)
ERYTHROCYTE [DISTWIDTH] IN BLOOD BY AUTOMATED COUNT: 13 % (ref 11.9–14.6)
FETAL SCREEN,FMHS: NORMAL
HCT VFR BLD AUTO: 32.4 % (ref 35.8–46.3)
HGB BLD-MCNC: 11 G/DL (ref 11.7–15.4)
IMM GRANULOCYTES # BLD AUTO: 0 K/UL (ref 0–0.5)
IMM GRANULOCYTES NFR BLD AUTO: 0 % (ref 0–5)
LYMPHOCYTES # BLD: 1.3 K/UL (ref 0.5–4.6)
LYMPHOCYTES NFR BLD: 16 % (ref 13–44)
MCH RBC QN AUTO: 31.3 PG (ref 26.1–32.9)
MCHC RBC AUTO-ENTMCNC: 34 G/DL (ref 31.4–35)
MCV RBC AUTO: 92.3 FL (ref 79.6–97.8)
MONOCYTES # BLD: 0.5 K/UL (ref 0.1–1.3)
MONOCYTES NFR BLD: 6 % (ref 4–12)
NEUTS SEG # BLD: 6.2 K/UL (ref 1.7–8.2)
NEUTS SEG NFR BLD: 77 % (ref 43–78)
NRBC # BLD: 0 K/UL (ref 0–0.2)
PLATELET # BLD AUTO: 167 K/UL (ref 150–450)
PMV BLD AUTO: 11.4 FL (ref 9.4–12.3)
RBC # BLD AUTO: 3.51 M/UL (ref 4.05–5.2)
WBC # BLD AUTO: 8 K/UL (ref 4.3–11.1)

## 2021-07-27 PROCEDURE — 85461 HEMOGLOBIN FETAL: CPT

## 2021-07-27 PROCEDURE — 36415 COLL VENOUS BLD VENIPUNCTURE: CPT

## 2021-07-27 PROCEDURE — 74011250636 HC RX REV CODE- 250/636: Performed by: OBSTETRICS & GYNECOLOGY

## 2021-07-27 PROCEDURE — 74011250637 HC RX REV CODE- 250/637: Performed by: STUDENT IN AN ORGANIZED HEALTH CARE EDUCATION/TRAINING PROGRAM

## 2021-07-27 PROCEDURE — 85025 COMPLETE CBC W/AUTO DIFF WBC: CPT

## 2021-07-27 PROCEDURE — 74011250636 HC RX REV CODE- 250/636: Performed by: STUDENT IN AN ORGANIZED HEALTH CARE EDUCATION/TRAINING PROGRAM

## 2021-07-27 PROCEDURE — 65270000029 HC RM PRIVATE

## 2021-07-27 PROCEDURE — 2709999900 HC NON-CHARGEABLE SUPPLY

## 2021-07-27 PROCEDURE — 74011250637 HC RX REV CODE- 250/637: Performed by: OBSTETRICS & GYNECOLOGY

## 2021-07-27 RX ORDER — OXYCODONE HYDROCHLORIDE 5 MG/1
5 TABLET ORAL
Status: DISCONTINUED | OUTPATIENT
Start: 2021-07-27 | End: 2021-07-28 | Stop reason: HOSPADM

## 2021-07-27 RX ORDER — IBUPROFEN 800 MG/1
800 TABLET ORAL EVERY 6 HOURS
Status: DISCONTINUED | OUTPATIENT
Start: 2021-07-27 | End: 2021-07-28 | Stop reason: HOSPADM

## 2021-07-27 RX ORDER — OXYCODONE AND ACETAMINOPHEN 7.5; 325 MG/1; MG/1
2 TABLET ORAL
Status: DISCONTINUED | OUTPATIENT
Start: 2021-07-27 | End: 2021-07-27

## 2021-07-27 RX ORDER — ZOLPIDEM TARTRATE 5 MG/1
5 TABLET ORAL
Status: DISCONTINUED | OUTPATIENT
Start: 2021-07-27 | End: 2021-07-28 | Stop reason: HOSPADM

## 2021-07-27 RX ORDER — NALOXONE HYDROCHLORIDE 0.4 MG/ML
0.4 INJECTION, SOLUTION INTRAMUSCULAR; INTRAVENOUS; SUBCUTANEOUS AS NEEDED
Status: DISCONTINUED | OUTPATIENT
Start: 2021-07-27 | End: 2021-07-28 | Stop reason: HOSPADM

## 2021-07-27 RX ORDER — IBUPROFEN 800 MG/1
800 TABLET ORAL
Status: DISCONTINUED | OUTPATIENT
Start: 2021-07-27 | End: 2021-07-27

## 2021-07-27 RX ORDER — DIPHENHYDRAMINE HCL 25 MG
25 CAPSULE ORAL
Status: DISCONTINUED | OUTPATIENT
Start: 2021-07-27 | End: 2021-07-28 | Stop reason: HOSPADM

## 2021-07-27 RX ORDER — ACETAMINOPHEN 500 MG
1000 TABLET ORAL
Status: DISCONTINUED | OUTPATIENT
Start: 2021-07-27 | End: 2021-07-28 | Stop reason: HOSPADM

## 2021-07-27 RX ORDER — OXYCODONE AND ACETAMINOPHEN 7.5; 325 MG/1; MG/1
1 TABLET ORAL
Status: DISCONTINUED | OUTPATIENT
Start: 2021-07-27 | End: 2021-07-27

## 2021-07-27 RX ORDER — OXYCODONE HYDROCHLORIDE 5 MG/1
10 TABLET ORAL
Status: DISCONTINUED | OUTPATIENT
Start: 2021-07-27 | End: 2021-07-28 | Stop reason: HOSPADM

## 2021-07-27 RX ADMIN — IBUPROFEN 800 MG: 800 TABLET, FILM COATED ORAL at 12:41

## 2021-07-27 RX ADMIN — DOCUSATE SODIUM 100 MG: 100 CAPSULE ORAL at 10:25

## 2021-07-27 RX ADMIN — SIMETHICONE 80 MG: 80 TABLET, CHEWABLE ORAL at 18:01

## 2021-07-27 RX ADMIN — SIMETHICONE 80 MG: 80 TABLET, CHEWABLE ORAL at 12:41

## 2021-07-27 RX ADMIN — ACETAMINOPHEN 650 MG: 325 TABLET, FILM COATED ORAL at 10:25

## 2021-07-27 RX ADMIN — ACETAMINOPHEN 650 MG: 325 TABLET, FILM COATED ORAL at 04:12

## 2021-07-27 RX ADMIN — VALACYCLOVIR HYDROCHLORIDE 500 MG: 500 TABLET, FILM COATED ORAL at 10:25

## 2021-07-27 RX ADMIN — DOCUSATE SODIUM 100 MG: 100 CAPSULE ORAL at 18:01

## 2021-07-27 RX ADMIN — SERTRALINE HYDROCHLORIDE 100 MG: 50 TABLET ORAL at 10:25

## 2021-07-27 RX ADMIN — OXYCODONE 5 MG: 5 TABLET ORAL at 18:31

## 2021-07-27 RX ADMIN — SIMETHICONE 80 MG: 80 TABLET, CHEWABLE ORAL at 10:25

## 2021-07-27 RX ADMIN — SIMETHICONE 80 MG: 80 TABLET, CHEWABLE ORAL at 21:46

## 2021-07-27 RX ADMIN — OXYCODONE 2.5 MG: 5 TABLET ORAL at 14:54

## 2021-07-27 RX ADMIN — IBUPROFEN 800 MG: 800 TABLET, FILM COATED ORAL at 18:02

## 2021-07-27 RX ADMIN — KETOROLAC TROMETHAMINE 30 MG: 30 INJECTION, SOLUTION INTRAMUSCULAR; INTRAVENOUS at 06:00

## 2021-07-27 RX ADMIN — SIMETHICONE 80 MG: 80 TABLET, CHEWABLE ORAL at 00:08

## 2021-07-27 RX ADMIN — KETOROLAC TROMETHAMINE 30 MG: 30 INJECTION, SOLUTION INTRAMUSCULAR; INTRAVENOUS at 00:08

## 2021-07-27 RX ADMIN — RHO(D) IMMUNE GLOBULIN (HUMAN) 0.3 MG: 1500 SOLUTION INTRAMUSCULAR at 10:25

## 2021-07-27 RX ADMIN — ACETAMINOPHEN 1000 MG: 500 TABLET, FILM COATED ORAL at 21:46

## 2021-07-27 NOTE — LACTATION NOTE
This note was copied from a baby's chart. Lactation visit. Mom with a lot of vomiting yesterday following csection but much better now. Mom sitting up in bed holding baby. Feeling much improved. Pump at bedside. Has not pumped yet. Has attempted latching a couple times, but no latch. Is bottle feeding formula. Discussed supply and demand. Recommended that mom begin pumping soon if she desires milk production. Discussed pumping every 3 hours, reviewed expected volumes with colostrum and milk to come soon with good stimulation, especially as mom has pumped previously. Mom states understanding, has used a hospital pump in the past. Offered help with pumping or latching if mom desires. Mom does plan to rent a pump for discharge.

## 2021-07-27 NOTE — PROGRESS NOTES
Chart reviewed - depression/anxiety. SW met with patient while social distancing w/appropriate PPE. Patient denies any history of postpartum depression/anxiety with her first two children. She does confirm a history of generalized depression/anxiety. She has been on Zoloft for the past 2-3 years and has found this medication beneficial.  Additionally, patient has a therapist that she sees regularly. Patient also has a strong/local support group. Patient given informational packet on  mood & anxiety disorders (resources/education). Family denies any additional needs from  at this time. Family has 's contact information should any needs/questions arise.     SEBLE Serrano  [facility]   [phone]

## 2021-07-27 NOTE — PROGRESS NOTES
Post-Operative Day Number 1 Progress Note    Patient doing well post-op day 1 from  delivery without significant complaints. Pain controlled on current medication. Voiding without difficulty, normal lochia. Vitals:    Patient Vitals for the past 8 hrs:   BP Temp Pulse Resp SpO2   21 0657 117/69 97.8 °F (36.6 °C) (!) 57 16 100 %   21 0412 (!) 112/55 97.7 °F (36.5 °C) (!) 58 16 98 %     Temp (24hrs), Av.9 °F (36.6 °C), Min:97.5 °F (36.4 °C), Max:98.6 °F (37 °C)      Vital signs stable, afebrile. Exam:  Patient without distress. Abdomen soft, fundus firm at level of umbilicus, non tender. Incision dry and clean without erythema. Lower extremities are negative for swelling, cords or tenderness. Lab/Data Review:  CBC:   Lab Results   Component Value Date/Time    WBC 8.0 2021 06:54 AM    HGB 11.0 (L) 2021 06:54 AM    HCT 32.4 (L) 2021 06:54 AM     2021 06:54 AM       Assessment and Plan:  Patient appears to be having uncomplicated post- course. Continue routine post-op care and maternal education.

## 2021-07-27 NOTE — PROGRESS NOTES
Notified Dr. Kala Waldrop of patient request to avoid narcotic if possible, discontinue percocet and change motrin 800 mg po to every 6 hours, tylenol 1000 mg every 6 hours prn, mild pain, oxycodone 5 mg po every 4 hours prn moderate pain, oxycodone 10 mg every 4 hours prn severe pain, read back

## 2021-07-27 NOTE — PROGRESS NOTES
Anesthesiology  Post-op Note    Post-op day 1 s/p  via spinal with neuraxial opioids for post-op pain management. Visit Vitals  BP (!) 112/55 (BP 1 Location: Right upper arm, BP Patient Position: At rest)   Pulse (!) 58   Temp 36.5 °C (97.7 °F)   Resp 16   Ht 5' 5\" (1.651 m)   Wt 108.4 kg (239 lb)   LMP 10/26/2020 (Exact Date)   SpO2 98%   Breastfeeding Yes   BMI 39.77 kg/m²     Airway patent, patient appropriately hydrated and appears euvolemic. Patient is Alert and oriented. Pain is well controlled. Pruritus is well controlled. Nausea is well controlled. No complaints about back or site of injection. Motor and sensory function has returned to baseline in lower extremities. Patient is satisfied with anesthetic and reports no complications. Continue current orders, then initiate surgeon's orders for pain management 24 hours after . Follow up per surgeon.

## 2021-07-27 NOTE — PROGRESS NOTES
Vigil d/c'd, IV capped, sequential device discontinued. Ambulated to restroom for teaching of sonny-care and pad change. Bed linen changed. Returned to bed safely. Tolerated without difficulty.

## 2021-07-28 VITALS
BODY MASS INDEX: 39.82 KG/M2 | WEIGHT: 239 LBS | SYSTOLIC BLOOD PRESSURE: 121 MMHG | RESPIRATION RATE: 18 BRPM | HEART RATE: 55 BPM | DIASTOLIC BLOOD PRESSURE: 71 MMHG | HEIGHT: 65 IN | OXYGEN SATURATION: 98 % | TEMPERATURE: 98 F

## 2021-07-28 PROCEDURE — 74011250636 HC RX REV CODE- 250/636: Performed by: OBSTETRICS & GYNECOLOGY

## 2021-07-28 PROCEDURE — 90715 TDAP VACCINE 7 YRS/> IM: CPT | Performed by: OBSTETRICS & GYNECOLOGY

## 2021-07-28 PROCEDURE — 74011250637 HC RX REV CODE- 250/637: Performed by: OBSTETRICS & GYNECOLOGY

## 2021-07-28 RX ORDER — OXYCODONE HYDROCHLORIDE 5 MG/1
5-10 TABLET ORAL
Qty: 15 TABLET | Refills: 0 | Status: SHIPPED | OUTPATIENT
Start: 2021-07-28 | End: 2021-07-31

## 2021-07-28 RX ORDER — ACETAMINOPHEN 500 MG
1000 TABLET ORAL
Qty: 30 TABLET | Refills: 0 | Status: SHIPPED | OUTPATIENT
Start: 2021-07-28

## 2021-07-28 RX ORDER — IBUPROFEN 800 MG/1
800 TABLET ORAL
Qty: 30 TABLET | Refills: 0 | Status: SHIPPED | OUTPATIENT
Start: 2021-07-28

## 2021-07-28 RX ADMIN — ACETAMINOPHEN 1000 MG: 500 TABLET, FILM COATED ORAL at 04:04

## 2021-07-28 RX ADMIN — TETANUS TOXOID, REDUCED DIPHTHERIA TOXOID AND ACELLULAR PERTUSSIS VACCINE, ADSORBED 0.5 ML: 5; 2.5; 8; 8; 2.5 SUSPENSION INTRAMUSCULAR at 09:46

## 2021-07-28 RX ADMIN — SIMETHICONE 80 MG: 80 TABLET, CHEWABLE ORAL at 09:06

## 2021-07-28 RX ADMIN — IBUPROFEN 800 MG: 800 TABLET, FILM COATED ORAL at 00:16

## 2021-07-28 RX ADMIN — VALACYCLOVIR HYDROCHLORIDE 500 MG: 500 TABLET, FILM COATED ORAL at 09:06

## 2021-07-28 RX ADMIN — SERTRALINE HYDROCHLORIDE 100 MG: 50 TABLET ORAL at 09:06

## 2021-07-28 RX ADMIN — IBUPROFEN 800 MG: 800 TABLET, FILM COATED ORAL at 06:26

## 2021-07-28 RX ADMIN — OXYCODONE 10 MG: 5 TABLET ORAL at 09:06

## 2021-07-28 RX ADMIN — DOCUSATE SODIUM 100 MG: 100 CAPSULE ORAL at 09:06

## 2021-07-28 NOTE — LACTATION NOTE
This note was copied from a baby's chart. Individualized Feeding Plan for Breastfeeding   Lactation Services [phone]      As much as possible, hold your baby on your chest so babys bare skin is against your bare skin with a blanket covering babys back, especially 30 minutes before it is time for baby to eat. Watch for early feeding cues such as, licking lips, sucking motions, rooting, hands to mouth. Crying is a late feeding cue. Feed your baby at least 8 times in 24 hours, or more if your baby is showing feeding cues. If baby is sleepy put baby skin to skin and watch for hunger cues. To rouse baby: unwrap, undress, massage hands, feet, & back, change diaper, gently change babys position from lying to sitting. 15-20 minutes on the first breast of active breastfeeding is considered a good feeding. Good, active breastfeeding is when baby is alert, tugging the nipple, their ear may move, and you can hear swallows. Allow baby to finish the first side before changing sides. Sleeping at the breast or only brief, light sucks should not be considered a good, full breastfeed. At each feedin.  Baby needs to NURSE WELL x 15-20 minutes on at least first breast, burp and offer 2nd breast at every feeding. If no sustained latch only attempt at breast for 10 minutes. If baby does not latch on and feed well on at least one side, you should:          2. Double pump for 15 minutes with breast massage and compression. Hand express for an additional 2-3 minutes per side. Pump after each feeding attempt or poor feeding, up to 8 times per day. If you are not putting baby to the breast you need to pump 8 times a day. Pump every 3 hours. 3. Give baby all of the breast milk you obtain using a straight syringe or  curved syringe.     If baby does NOT have enough wet and dirty diapers per day, is jaundiced/lethargic, or has significant weight loss AND you do NOT pump enough milk for each feeding (per volume listed below), formula supplementation may need to be used. Call lactation department /pediatrician if you have concerns. AVERAGE INTAKES OF COLOSTRUM BY HEALTHY  INFANTS:  Time  Day Intake (ml per feeding)  Based on 8 feedings per day. 1st 24 hrs  1 2-10 ml  24-48 hrs  2 5-15 ml  48-72 hrs  3 15-30 ml (0.5-1 oz)  72-96 hrs  4 30-45 ml (1-1.5oz)                          5-6      45-60 ml (1.5-2oz)                           7         80ml + more ad desired    By day 7, baby will need 80 ml or 2.6 oz at each feeding based on 8 feedings per day & babys weight. (1oz = 30ml). Total milk volume needed in 24 hours by Day 7 is 21 oz per day based on baby's birthweight of 7lb 13oz. The more often baby eats, the less volume they need per feeding. If baby is eating more often than the minimum of 8 times per day, they may take less per feeding. Comments: If pumping, suggest using olive oil or coconut oil on your nipples before pumping to help reduce the friction. Use feeding plan until follow up with pediatrician. Continue to attempt at the breast for most feeds. Pump every 3 hours if no latch. Give all pumped colostrum/breastmilk at each feeding. OUTPATIENT APPOINTMENT Suggested. Outpatient services are located on the 4th floor at [facility]. Check in at the 4th floor registration desk (the same one you used when you came to have your baby).   Call for questions [phone]

## 2021-07-28 NOTE — LACTATION NOTE

## 2021-07-28 NOTE — PROGRESS NOTES
Post-Partum Day Number 2 Progress Note    Patient doing well  without significant complaints. Pain controlled on current medication. Voiding without difficulty, normal lochia. Pt would like to go home today. Vitals:    Visit Vitals  /71 (BP 1 Location: Right arm, BP Patient Position: At rest)   Pulse (!) 55   Temp 98 °F (36.7 °C)   Resp 18   Ht 5' 5\" (1.651 m)   Wt 239 lb (108.4 kg)   LMP 10/26/2020 (Exact Date)   SpO2 98%   Breastfeeding Yes   BMI 39.77 kg/m²       Vital signs stable, afebrile. Exam:  Patient without distress. Heart rrr  Lungs cta b&s               Abdomen soft, fundus firm at level of umbilicus, nontender. Incision clean, dry and intact. Lower extremities are negative for swelling, cords or tenderness. Lab Results   Component Value Date/Time    ABO Group O 12/23/2020 01:55 PM    Rh (D) Negative 12/23/2020 01:55 PM    ABO/Rh(D) Charlene Peaks NEGATIVE 07/26/2021 08:52 AM        Lab Results   Component Value Date/Time    ABO/Rh(D) Charlene Peaks NEGATIVE 07/26/2021 08:52 AM    Antibody screen NEG 07/26/2021 08:52 AM    Antibody screen, External negative 12/23/2020 12:00 AM    Rubella, External immune 12/23/2020 12:00 AM    GrBStrep, External positive 08/26/2009 12:00 AM    ABO,Rh O Negative 12/23/2020 12:00 AM     Lab Results   Component Value Date/Time    HGB 11.0 (L) 07/27/2021 06:54 AM    Hgb, External 12.3 12/23/2020 12:00 AM         Assessment and Plan:  Patient appears to be having uncomplicated post-partum course. Continue routine post-delivery care and maternal education. Breastfeeding. Will discharge home.

## 2021-07-28 NOTE — DISCHARGE SUMMARY
Obstetrical Discharge Summary     Name: Melva Garland MRN: [id number]  SSN: [id number]    YOB: 1985  Age: 30 y.o. Sex: female      Allergies: Patient has no known allergies. Admit Date: 2021    Discharge Date: 2021     Admitting Physician: Carissa Metz MD     Attending Physician:  Vince Verdugo MD     * Admission Diagnoses: History of  [O05.613];05 weeks gestation of pregnancy [Z3A.39]    * Discharge Diagnoses:   Information for the patient's :  Angelika Mazariegos [[id number]]   Delivery of a   male infant via , Low Transverse on 2021 at 12:00 PM  by Miracle Perez. Apgars were 8  and 9 . Additional Diagnoses:   Hospital Problems as of 2021 Date Reviewed: 2021        Codes Class Noted - Resolved POA    39 weeks gestation of pregnancy ICD-10-CM: Z3A.39  ICD-9-CM: V22.2  2021 - Present Unknown        * (Principal) Genital herpes affecting pregnancy ICD-10-CM: O98.319, A60.09  ICD-9-CM: 647.60, 054.10  2020 - Present Yes    Overview Signed 2020  1:52 PM by Daria Rajput Valtrex PRN. Lab Results   Component Value Date/Time    ABO/Rh(D) O NEGATIVE 2021 08:52 AM    Rubella, External immune 2020 12:00 AM    GrBStrep, External positive 2009 12:00 AM    ABO,Rh O Negative 2020 12:00 AM      Immunization History   Administered Date(s) Administered    Rho(D) Immune Globulin - IM 2021, 2021    Rhogam Injection 2009       * Procedures:   Procedure(s):   SECTION           * Discharge Condition: good    * Hospital Course: Normal hospital course following the delivery. * Disposition: Home    Discharge Medications:   Current Discharge Medication List      START taking these medications    Details   acetaminophen (TYLENOL) 500 mg tablet Take 2 Tablets by mouth every six (6) hours as needed for Pain.   Qty: 30 Tablet, Refills: 0  Start date: 7/28/2021      ibuprofen (MOTRIN) 800 mg tablet Take 1 Tablet by mouth every eight (8) hours as needed for Pain. Qty: 30 Tablet, Refills: 0  Start date: 7/28/2021      oxyCODONE IR (ROXICODONE) 5 mg immediate release tablet Take 1-2 Tablets by mouth every four (4) hours as needed for Pain for up to 3 days. Max Daily Amount: 60 mg.  Qty: 15 Tablet, Refills: 0  Start date: 7/28/2021, End date: 7/31/2021    Associated Diagnoses: 39 weeks gestation of pregnancy         CONTINUE these medications which have NOT CHANGED    Details   valACYclovir (VALTREX) 500 mg tablet Take 1 Tablet by mouth daily. Qty: 90 Tablet, Refills: 3      sertraline (ZOLOFT) 100 mg tablet Take 1 Tab by mouth daily. Qty: 30 Tab, Refills: 11    Associated Diagnoses: Depression affecting pregnancy      prenatal vit 91/iron/folic/dha (PRENATAL + DHA PO) Take  by mouth. * Follow-up Care/Patient Instructions: Activity: No sex for 6 weeks  Diet: Regular Diet  Wound Care: As directed    Follow-up Information     Follow up With Specialties Details Why Contact Info    [facility], Not On File    Not On File (62) Patient has a PCP but that physician is not listed in [address] [facility].

## 2021-07-28 NOTE — DISCHARGE INSTRUCTIONS
Patient Education         Section: What to Expect at Home  Your Recovery     A  section, or , is surgery to deliver your baby through a cut that the doctor makes in your lower belly and uterus. The cut is called an incision. You may have some pain in your lower belly and need pain medicine for 1 to 2 weeks. You can expect some vaginal bleeding for several weeks. You will probably need about 6 weeks to fully recover. It's important to take it easy while the incision heals. Avoid heavy lifting, strenuous activities, and exercises that strain the belly muscles while you recover. Ask a family member or friend for help with housework, cooking, and shopping. This care sheet gives you a general idea about how long it will take for you to recover. But each person recovers at a different pace. Follow the steps below to get better as quickly as possible. How can you care for yourself at home? Activity    · Rest when you feel tired. Getting enough sleep will help you recover.     · Try to walk each day. Start by walking a little more than you did the day before. Bit by bit, increase the amount you walk. Walking boosts blood flow and helps prevent pneumonia, constipation, and blood clots.     · Avoid strenuous activities, such as bicycle riding, jogging, weightlifting, and aerobic exercise, for 6 weeks or until your doctor says it is okay.     · Until your doctor says it is okay, do not lift anything heavier than your baby.     · Do not do sit-ups or other exercises that strain the belly muscles for 6 weeks or until your doctor says it is okay.     · Hold a pillow over your incision when you cough or take deep breaths. This will support your belly and decrease your pain.     · You may shower as usual. Pat the incision dry when you are done.     · You will have some vaginal bleeding. Wear sanitary pads.  Do not douche or use tampons until your doctor says it is okay.     · Ask your doctor when you can drive again.     · You will probably need to take at least 6 weeks off work. It depends on the type of work you do and how you feel.     · Ask your doctor when it is okay for you to have sex. Diet    · You can eat your normal diet. If your stomach is upset, try bland, low-fat foods like plain rice, broiled chicken, toast, and yogurt.     · Drink plenty of fluids (unless your doctor tells you not to).     · You may notice that your bowel movements are not regular right after your surgery. This is common. Try to avoid constipation and straining with bowel movements. You may want to take a fiber supplement every day. If you have not had a bowel movement after a couple of days, ask your doctor about taking a mild laxative.     · If you are breastfeeding, limit alcohol. Alcohol can cause a lack of energy and other health problems for the baby when a breastfeeding woman drinks heavily. It can also get in the way of a mom's ability to feed her baby or to care for the child in other ways. There isn't a lot of research about exactly how much alcohol can harm a baby. Having no alcohol is the safest choice for your baby. If you choose to have a drink now and then, have only one drink, and limit the number of occasions that you have a drink. Wait to breastfeed at least 2 hours after you have a drink to reduce the amount of alcohol the baby may get in the milk. Medicines    · Your doctor will tell you if and when you can restart your medicines. He or she will also give you instructions about taking any new medicines.     · If you take aspirin or some other blood thinner, ask your doctor if and when to start taking it again. Make sure that you understand exactly what your doctor wants you to do.     · Take pain medicines exactly as directed. ? If the doctor gave you a prescription medicine for pain, take it as prescribed.   ? If you are not taking a prescription pain medicine, ask your doctor if you can take an over-the-counter medicine.     · If you think your pain medicine is making you sick to your stomach:  ? Take your medicine after meals (unless your doctor has told you not to). ? Ask your doctor for a different pain medicine.     · If your doctor prescribed antibiotics, take them as directed. Do not stop taking them just because you feel better. You need to take the full course of antibiotics. Incision care    · If you have strips of tape on the incision, leave the tape on for a week or until it falls off.     · Wash the area daily with warm, soapy water, and pat it dry. Don't use hydrogen peroxide or alcohol, which can slow healing. You may cover the area with a gauze bandage if it weeps or rubs against clothing. Change the bandage every day.     · Keep the area clean and dry. Other instructions    · If you breastfeed your baby, you may be more comfortable while you are healing if you place the baby so that he or she is not resting on your belly. Try tucking your baby under your arm, with his or her body along the side you will be feeding on. Support your baby's upper body with your arm. With that hand you can control your baby's head to bring his or her mouth to your breast. This is sometimes called the football hold. Follow-up care is a key part of your treatment and safety. Be sure to make and go to all appointments, and call your doctor if you are having problems. It's also a good idea to know your test results and keep a list of the medicines you take. When should you call for help? Call  911 anytime you think you may need emergency care. For example, call if:    · You have thoughts of harming yourself, your baby, or another person.     · You passed out (lost consciousness).     · You have chest pain, are short of breath, or cough up blood.     · You have a seizure.    Call your doctor now or seek immediate medical care if:    · You have pain that does not get better after you take pain medicine.     · You have severe vaginal bleeding.     · You are dizzy or lightheaded, or you feel like you may faint.     · You have new or worse pain in your belly or pelvis.     · You have loose stitches, or your incision comes open.     · You have symptoms of infection, such as:  ? Increased pain, swelling, warmth, or redness. ? Red streaks leading from the incision. ? Pus draining from the incision. ? A fever.     · You have symptoms of a blood clot in your leg (called a deep vein thrombosis), such as:  ? Pain in your calf, back of the knee, thigh, or groin. ? Redness and swelling in your leg or groin.     · You have signs of preeclampsia, such as:  ? Sudden swelling of your face, hands, or feet. ? New vision problems (such as dimness, blurring, or seeing spots). ? A severe headache. Watch closely for changes in your health, and be sure to contact your doctor if:    · You do not get better as expected. Where can you learn more? Go to http://www.arceo.com/  Enter M806 in the search box to learn more about \" Section: What to Expect at Home. \"  Current as of: 2020               Content Version: 12.8   [employer]. Care instructions adapted under license by [employer] (which disclaims liability or warranty for this information). If you have questions about a medical condition or this instruction, always ask your healthcare professional. [place] [address] any warranty or liability for your use of this information.

## 2022-03-18 PROBLEM — D21.9 FIBROID: Status: ACTIVE | Noted: 2021-03-18

## 2022-03-18 PROBLEM — Z86.59 HISTORY OF BULIMIA: Status: ACTIVE | Noted: 2020-12-23

## 2022-03-18 PROBLEM — O09.93 HIGH-RISK PREGNANCY IN THIRD TRIMESTER: Status: ACTIVE | Noted: 2020-12-23

## 2022-03-18 PROBLEM — O99.343 ANXIETY DURING PREGNANCY, ANTEPARTUM, THIRD TRIMESTER: Status: ACTIVE | Noted: 2020-12-23

## 2022-03-18 PROBLEM — F41.9 ANXIETY DURING PREGNANCY, ANTEPARTUM, THIRD TRIMESTER: Status: ACTIVE | Noted: 2020-12-23

## 2022-03-19 PROBLEM — O98.513 COVID-19 AFFECTING PREGNANCY IN THIRD TRIMESTER: Status: ACTIVE | Noted: 2021-05-27

## 2022-03-19 PROBLEM — Z87.59 HISTORY OF DELIVERY OF MACROSOMAL INFANT: Status: ACTIVE | Noted: 2020-12-23

## 2022-03-19 PROBLEM — O26.899 RH NEGATIVE STATE IN ANTEPARTUM PERIOD: Status: ACTIVE | Noted: 2020-12-28

## 2022-03-19 PROBLEM — U07.1 COVID-19 AFFECTING PREGNANCY IN THIRD TRIMESTER: Status: ACTIVE | Noted: 2021-05-27

## 2022-03-19 PROBLEM — Z67.91 RH NEGATIVE STATE IN ANTEPARTUM PERIOD: Status: ACTIVE | Noted: 2020-12-28

## 2022-03-19 PROBLEM — O98.319 GENITAL HERPES AFFECTING PREGNANCY: Status: ACTIVE | Noted: 2020-12-23

## 2022-03-19 PROBLEM — Z3A.39 39 WEEKS GESTATION OF PREGNANCY: Status: ACTIVE | Noted: 2021-07-26

## 2022-03-19 PROBLEM — R87.619 ABNORMAL PAP SMEAR OF CERVIX: Status: ACTIVE | Noted: 2020-12-23

## 2022-03-19 PROBLEM — A60.09 GENITAL HERPES AFFECTING PREGNANCY: Status: ACTIVE | Noted: 2020-12-23

## 2022-03-20 PROBLEM — O99.213 OBESITY AFFECTING PREGNANCY IN THIRD TRIMESTER: Status: ACTIVE | Noted: 2021-06-04

## 2024-01-08 ENCOUNTER — OFFICE VISIT (OUTPATIENT)
Dept: OBGYN CLINIC | Age: 39
End: 2024-01-08
Payer: COMMERCIAL

## 2024-01-08 VITALS
HEIGHT: 65 IN | SYSTOLIC BLOOD PRESSURE: 132 MMHG | DIASTOLIC BLOOD PRESSURE: 66 MMHG | BODY MASS INDEX: 34.81 KG/M2 | WEIGHT: 208.9 LBS

## 2024-01-08 DIAGNOSIS — Z01.419 WELL WOMAN EXAM: Primary | ICD-10-CM

## 2024-01-08 DIAGNOSIS — Z11.51 SCREENING FOR HUMAN PAPILLOMAVIRUS (HPV): ICD-10-CM

## 2024-01-08 DIAGNOSIS — Z13.89 SCREENING FOR GENITOURINARY CONDITION: ICD-10-CM

## 2024-01-08 DIAGNOSIS — Z12.4 SCREENING FOR CERVICAL CANCER: ICD-10-CM

## 2024-01-08 LAB
BILIRUBIN, URINE, POC: NEGATIVE
BLOOD URINE, POC: NORMAL
GLUCOSE URINE, POC: NEGATIVE
KETONES, URINE, POC: NEGATIVE
LEUKOCYTE ESTERASE, URINE, POC: NEGATIVE
NITRITE, URINE, POC: NEGATIVE
PH, URINE, POC: 7 (ref 4.6–8)
PROTEIN,URINE, POC: NEGATIVE
SPECIFIC GRAVITY, URINE, POC: 1.01 (ref 1–1.03)
URINALYSIS CLARITY, POC: CLEAR
URINALYSIS COLOR, POC: YELLOW
UROBILINOGEN, POC: NORMAL

## 2024-01-08 PROCEDURE — 81002 URINALYSIS NONAUTO W/O SCOPE: CPT | Performed by: OBSTETRICS & GYNECOLOGY

## 2024-01-08 PROCEDURE — 99395 PREV VISIT EST AGE 18-39: CPT | Performed by: OBSTETRICS & GYNECOLOGY

## 2024-01-08 SDOH — ECONOMIC STABILITY: FOOD INSECURITY: WITHIN THE PAST 12 MONTHS, YOU WORRIED THAT YOUR FOOD WOULD RUN OUT BEFORE YOU GOT MONEY TO BUY MORE.: PATIENT DECLINED

## 2024-01-08 SDOH — ECONOMIC STABILITY: FOOD INSECURITY: WITHIN THE PAST 12 MONTHS, THE FOOD YOU BOUGHT JUST DIDN'T LAST AND YOU DIDN'T HAVE MONEY TO GET MORE.: PATIENT DECLINED

## 2024-01-08 SDOH — ECONOMIC STABILITY: HOUSING INSECURITY
IN THE LAST 12 MONTHS, WAS THERE A TIME WHEN YOU DID NOT HAVE A STEADY PLACE TO SLEEP OR SLEPT IN A SHELTER (INCLUDING NOW)?: PATIENT DECLINED

## 2024-01-08 SDOH — ECONOMIC STABILITY: TRANSPORTATION INSECURITY
IN THE PAST 12 MONTHS, HAS LACK OF TRANSPORTATION KEPT YOU FROM MEETINGS, WORK, OR FROM GETTING THINGS NEEDED FOR DAILY LIVING?: PATIENT DECLINED

## 2024-01-08 SDOH — ECONOMIC STABILITY: INCOME INSECURITY: HOW HARD IS IT FOR YOU TO PAY FOR THE VERY BASICS LIKE FOOD, HOUSING, MEDICAL CARE, AND HEATING?: PATIENT DECLINED
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
## 2024-01-17 ENCOUNTER — TELEPHONE (OUTPATIENT)
Dept: OBGYN CLINIC | Age: 39
End: 2024-01-17

## 2024-01-17 LAB
COLLECTION METHOD: ABNORMAL
CYTOLOGIST CVX/VAG CYTO: ABNORMAL
CYTOLOGY CVX/VAG DOC THIN PREP: ABNORMAL
HPV APTIMA: POSITIVE
HPV GENOTYPE 18,45: POSITIVE
HPV, GENOTYPE 16: NEGATIVE
Lab: ABNORMAL
PAP SOURCE: ABNORMAL
PATH REPORT.FINAL DX SPEC: ABNORMAL
PATHOLOGIST CVX/VAG CYTO: ABNORMAL
PATHOLOGIST PROVIDED ICD: ABNORMAL
RECOM F/U CVX/VAG CYTO: ABNORMAL
STAT OF ADQ CVX/VAG CYTO-IMP: ABNORMAL

## 2024-01-17 NOTE — TELEPHONE ENCOUNTER
Phoned patient and reviewed pap smear results. Reviewed that pt needs colposcopy. Pt verbalized understanding. All questions answered.

## 2024-01-17 NOTE — TELEPHONE ENCOUNTER
----- Message from Shanda Sow RN sent at 1/17/2024  2:38 PM EST -----    ----- Message -----  From: Fely Marinelli MD  Sent: 1/17/2024  11:15 AM EST  To: Shanda Sow RN    NEEDS COLPO   ----- Message -----  From: Chica Zavaleta Incoming [place] [facility]  Sent: 1/17/2024  10:37 AM EST  To: Fely Marinelli MD

## 2024-01-24 NOTE — PROGRESS NOTES
The patient is a 38 y.o.  who is seen for Ultrasound secondary to enlarged uterus. Pt last seen 2024 for WWE.  Had complaints of urgency bloating and back pain-  HGSIL  HPV +  Scheduled for colposcopy  NEEDS EMB   Complains of weakness after period check cbc  US findings from today:  CX APPEARS WNL   UTERUS IS ANTEVERTED AND HETEROGENOUS WITH ONE POSTERIOR FIBROID NOTED MEASURING 7.9 X 6.5 X   8.5 CM (PREVIOUSLY 5.1 X 4.0 X 5.3 CM).   ENDO= 17.9 MM, AN ECHOGENIC MASS WITH FEEDER VESSEL IS SEEN WITHIN ENDO MEASURING 2.9 X 1.4 X 2.1   CM.   ROV WNL   LOV WNL   OVARIES VISUALIZED TA   NO ADNEXAL MASSES OR FLUID   Uterine volume:465.537    HISTORY:      Patient's last menstrual period was 2024 (exact date).    Current Outpatient Medications on File Prior to Visit   Medication Sig Dispense Refill    acetaminophen (TYLENOL) 500 MG tablet Take 2 tablets by mouth every 6 hours as needed      ibuprofen (ADVIL;MOTRIN) 800 MG tablet Take 1 tablet by mouth every 8 hours as needed       No current facility-administered medications on file prior to visit.       ROS:  Feeling well. No dyspnea or chest pain on exertion.  No abdominal pain, change in bowel habits, black or bloody stools.  No urinary tract symptoms. GYN ROS: no breast pain or new or enlarging lumps on self exam.    PHYSICAL EXAM:  Blood pressure (!) 142/90, height 1.651 m (5' 5\"), weight 95.3 kg (210 lb 3.2 oz), last menstrual period 2024.    The patient appears well, alert, oriented x 3, in no distress.  Deferred  D/c pt us findings significant growth of fibroid- posterior    ASSESSMENT:    1. Enlarged uterus   Large 9 cm posterior fiborid  HGSIL  Thick lining      PLAN:  Wants hysterecotmy  Will plan emb and colpo for hgsil  Will repeat bimanual exam to verify she is candidate for TLH  Concern with abnormal pap that we will be able to get entire cervix  Check CBC  cytotec    Schedule tlh bs may need to be ce bs depending on exam as

## 2024-01-29 ENCOUNTER — PROCEDURE VISIT (OUTPATIENT)
Dept: OBGYN CLINIC | Age: 39
End: 2024-01-29
Payer: COMMERCIAL

## 2024-01-29 ENCOUNTER — OFFICE VISIT (OUTPATIENT)
Dept: OBGYN CLINIC | Age: 39
End: 2024-01-29
Payer: COMMERCIAL

## 2024-01-29 VITALS
BODY MASS INDEX: 35.02 KG/M2 | DIASTOLIC BLOOD PRESSURE: 90 MMHG | WEIGHT: 210.2 LBS | HEIGHT: 65 IN | SYSTOLIC BLOOD PRESSURE: 142 MMHG

## 2024-01-29 DIAGNOSIS — Z13.0 SCREENING, ANEMIA, DEFICIENCY, IRON: ICD-10-CM

## 2024-01-29 DIAGNOSIS — R87.613 HIGH GRADE SQUAMOUS INTRAEPITHELIAL LESION (HGSIL) ON CYTOLOGIC SMEAR OF CERVIX: ICD-10-CM

## 2024-01-29 DIAGNOSIS — N85.2 ENLARGED UTERUS: ICD-10-CM

## 2024-01-29 DIAGNOSIS — D21.9 FIBROID: ICD-10-CM

## 2024-01-29 DIAGNOSIS — R93.89 THICKENED ENDOMETRIUM: ICD-10-CM

## 2024-01-29 DIAGNOSIS — N85.2 ENLARGED UTERUS: Primary | ICD-10-CM

## 2024-01-29 DIAGNOSIS — D21.9 FIBROID: Primary | ICD-10-CM

## 2024-01-29 LAB
ERYTHROCYTE [DISTWIDTH] IN BLOOD BY AUTOMATED COUNT: 12.1 % (ref 11.9–14.6)
HCT VFR BLD AUTO: 41 % (ref 35.8–46.3)
HGB BLD-MCNC: 13 G/DL (ref 11.7–15.4)
MCH RBC QN AUTO: 28.9 PG (ref 26.1–32.9)
MCHC RBC AUTO-ENTMCNC: 31.7 G/DL (ref 31.4–35)
MCV RBC AUTO: 91.1 FL (ref 82–102)
NRBC # BLD: 0 K/UL (ref 0–0.2)
PLATELET # BLD AUTO: 256 K/UL (ref 150–450)
PMV BLD AUTO: 11.8 FL (ref 9.4–12.3)
RBC # BLD AUTO: 4.5 M/UL (ref 4.05–5.2)
WBC # BLD AUTO: 4.9 K/UL (ref 4.3–11.1)

## 2024-01-29 PROCEDURE — 99215 OFFICE O/P EST HI 40 MIN: CPT | Performed by: OBSTETRICS & GYNECOLOGY

## 2024-01-29 PROCEDURE — 76856 US EXAM PELVIC COMPLETE: CPT | Performed by: OBSTETRICS & GYNECOLOGY

## 2024-01-29 RX ORDER — MISOPROSTOL 200 UG/1
TABLET ORAL
Qty: 1 TABLET | Refills: 0 | Status: SHIPPED | OUTPATIENT
Start: 2024-01-29

## 2024-02-21 ENCOUNTER — PROCEDURE VISIT (OUTPATIENT)
Dept: OBGYN CLINIC | Age: 39
End: 2024-02-21
Payer: COMMERCIAL

## 2024-02-21 VITALS
WEIGHT: 210.3 LBS | BODY MASS INDEX: 35.04 KG/M2 | HEIGHT: 65 IN | DIASTOLIC BLOOD PRESSURE: 74 MMHG | SYSTOLIC BLOOD PRESSURE: 118 MMHG

## 2024-02-21 DIAGNOSIS — R87.618 PAP SMEAR ABNORMALITY OF CERVIX/HUMAN PAPILLOMAVIRUS (HPV) POSITIVE: ICD-10-CM

## 2024-02-21 DIAGNOSIS — Z32.02 PREGNANCY EXAMINATION OR TEST, NEGATIVE RESULT: ICD-10-CM

## 2024-02-21 DIAGNOSIS — N92.0 MENORRHAGIA WITH REGULAR CYCLE: ICD-10-CM

## 2024-02-21 DIAGNOSIS — R87.613 HSIL (HIGH GRADE SQUAMOUS INTRAEPITHELIAL LESION) ON PAP SMEAR OF CERVIX: Primary | ICD-10-CM

## 2024-02-21 LAB
HCG, PREGNANCY, URINE, POC: NEGATIVE
VALID INTERNAL CONTROL, POC: YES

## 2024-02-21 PROCEDURE — 58110 BX DONE W/COLPOSCOPY ADD-ON: CPT | Performed by: OBSTETRICS & GYNECOLOGY

## 2024-02-21 PROCEDURE — 81025 URINE PREGNANCY TEST: CPT | Performed by: OBSTETRICS & GYNECOLOGY

## 2024-02-21 PROCEDURE — 57454 BX/CURETT OF CERVIX W/SCOPE: CPT | Performed by: OBSTETRICS & GYNECOLOGY

## 2024-02-21 NOTE — PROGRESS NOTES
Colposcopy Exam          Name:  Rahel Rangel    :  1985 Age:  38 y.o.    Indication for Colposcopy: 24 HSIL and HPV positive 16 Neg, 18/45: positive  Abnormal Pap and Colposcopy History:  21: ASCUS HPV: Positive  20: ASCUS HPV: Positive  3/8/19: ENDOCERVIX, CURETTINGS:   BENIGN ENDOCERVICAL TISSUE.   WES  19: ASCUS HPV: Positive   17: ASCUS HPV: Positive  Contraceptive method:  none    LMP:  Patient's last menstrual period was 2024 (exact date).  Tobacco Use:    Social History     Tobacco Use   Smoking Status Never   Smokeless Tobacco Never     Medications:    No current outpatient medications on file prior to visit.     No current facility-administered medications on file prior to visit.     Allergies:    Allergies as of 2024    (No Known Allergies)       uHCG:  negative    Abnormal Pap and Colposcopy History:  HGSIL hpv + 16.      Procedure:  The patient is counseled regarding the risks of coloposcopy, including bleeding, infection at the biopsy site or endometrium, and failure to identify the lesion.  Patient was placed in the lithotomy position.  The vulva was examined for suspicious lesions.  Subsequently a speculum was inserted and the cervix and vaginal wall were visualized.  3% Acetic acid was applied to the cervix using cotton swabs and the area was further visualized.  Abnormal areas were identified in the 12 oclock position of the cervix bright aceto white with irregular sharp borders of the cervix and biopsies were obtained. 12 0clock    Monsel's solution was used to control bleeding and good hemostasis was obtained.     Adequate Procedure: y  Pap Performed:  n  ECC Performed y  Additional lesion identified: n   Summary:  torito 3     ENDOMETRIAL BIOPSY    Date:  1985    Name:  Rahel Rangel     :  1985    Age:  38 y.o.    OB History          3    Para   3    Term   3            AB        Living   3

## 2024-02-22 NOTE — PROGRESS NOTES
Enhanced Recovery After GYN Surgery: non-diabetic patients    It is highly recommended you purchase and drink Ensure Complete - one bottle twice daily for five days starting on 03/15/24. Ensure Complete is the preferred formula over other Ensure formulas. It is recommended that you continue drinking this for one month after surgery.    The night before surgery 03/20/24, drink 2 bottles of the Ensure Pre-Surgery drink.     The morning of surgery 03/21/24, drink one bottle of the Ensure Pre-Surgery drink 2 hours prior to your arrival to the hospital. Drink this over 5-10 minutes.    Drink nothing else after drinking the pre-surgical drink the morning of surgery.    Bring your patient handbook with you to the hospital.    Things to remember:    1. You will be given clear liquids to drink, advancing diet as tolerated    2. You will be up and moving around with assistance 2-4 hours after surgery.    3. You will be given regularly scheduled pain medications (NSAIDS, Tylenol) with narcotics as needed.    4. You may be able to go home that night if the surgeon okays and you are up and eating and drinking. Otherwise, your discharge will be the following morning around lunch time.     5. Continue drinking Ensure Complete for 5 days after surgery.

## 2024-02-23 ENCOUNTER — TELEPHONE (OUTPATIENT)
Dept: OBGYN CLINIC | Age: 39
End: 2024-02-23

## 2024-02-23 NOTE — TELEPHONE ENCOUNTER
Phoned patient and notified of biopsy and emb results and ok to proceed with surgery. No further questions.

## 2024-02-23 NOTE — TELEPHONE ENCOUNTER
----- Message from Fely Marinelli MD sent at 2/23/2024 10:07 AM EST -----  Notify low grade dysplasia emb b9 ok to proceed with surgery  ----- Message -----  From: Chica Zavaleta Incoming [place] [employer]  Sent: 2/23/2024   9:31 AM EST  To: Fely Marinelli MD

## 2024-02-28 NOTE — PERIOP NOTE
Patient verified name and     Order for consent not found in EHR; patient verified.     Type 2 surgery    Labs per surgeon: No orders received.  Labs per anesthesia protocol: Hgb, T&S DOS; orders signed and held in EHR.   EKG: None per anesthesia protocol.       Patient informed of GYN class on 3/6/24 at which time labs will be drawn. Patient will also receive all patient education and if staying overnight will receive hospital approved surgical skin cleanser; if not, patient will use non-moisturizing soap.    Patient instructed to hold all vitamins 7 days prior to surgery and NSAIDS 5 days prior to surgery, patient verbalized understanding.    Patient instructed to continue previous medications as prescribed prior to surgery and to take the following medications the day of surgery according to anesthesia guidelines with a small sip of water: None.     Patient answered medical/surgical history questions at their best of ability. All prior to admission medications documented in [facility].

## 2024-02-28 NOTE — PERIOP NOTE
PLEASE CONTINUE TAKING ALL PRESCRIPTION MEDICATIONS UP TO THE DAY OF SURGERY UNLESS OTHERWISE DIRECTED BELOW.     DISCONTINUE all over the counter vitamins, supplements, and herbals 7 days prior to surgery. DISCONTINUE Non-Steroidal Anti-Inflammatory (NSAIDS) such as Advil, Ibuprofen, Motrin, Naproxen, and Aleve 5 days prior to surgery.      *IT IS OK TO TAKE TYLENOL, Allergy medication, and indigestion medications*     Prescription medications to HOLD               CONTINUE all prescriptions like normal up until the day of surgery--TAKE ONLY THE BELOW MEDICATIONS THE DAY OF SURGERY.    NONE          Comments   The day before surgery please take 2 Tylenol in the morning and then again before bed. You may use either regular or extra strength.           Please do not bring home medications with you on the day of surgery unless otherwise directed by your nurse.  If you are instructed to bring home medications, please give them to your nurse as they will be administered by the nursing staff.     If you have any questions, please call [facility] [phone].     Copy of above instructions will be given and reviewed with patient at GYN/ERAS class.

## 2024-03-06 ENCOUNTER — HOSPITAL ENCOUNTER (OUTPATIENT)
Dept: SURGERY | Age: 39
Discharge: HOME | End: 2024-03-09
Payer: COMMERCIAL

## 2024-03-06 DIAGNOSIS — Z01.818 PRE-OP TESTING: ICD-10-CM

## 2024-03-06 LAB — HGB BLD-MCNC: 12.7 G/DL (ref 11.7–15.4)

## 2024-03-06 PROCEDURE — 85018 HEMOGLOBIN: CPT

## 2024-03-06 PROCEDURE — 36415 COLL VENOUS BLD VENIPUNCTURE: CPT

## 2024-03-07 NOTE — PROGRESS NOTES
Preop Visit    Ms. Rangel presents for a preop visit.  She is scheduled for a  ERAS/ TOTAL ABDOMINAL HYSTERECTOMY/ BILATERAL SALPINGECTOMY .  Her history, meds, and allergies were reviewed.  The procedure was reviewed in detail as well as the risks of bleeding, infection, DVT and potential surgical complications involving the bladder, ureters, colon or intestines.  Also the alternatives,  benefits, recovery and follow-up. Prevention of SSI discussed as indicated.  All of her questions were answered.  All ?s answered plan abdominal sec to size and dysplasia  Exam:  Lungs CTAB  Heart RRR    See the hospital H&P as well as prior chart for details.    March 11, 2024 2/21/24 Surgical pathology    A:  \"CERVICAL BIOPSY 12 O'CLOCK\":  SQUAMOUS EPITHELIUM FOCALLY HAVING   CHANGES CONSISTENT WITH CERVICAL INTRAEPITHELIAL NEOPLASIA, I (MILD   DYSPLASIA).  TRANSFORMATION ZONE (ENDOCERVICAL COMPONENT) IS NOT   IDENTIFIED.        B:  \"ENDOMETRIAL BIOPSY\":  FRAGMENTS OF NONSECRETORY ENDOMETRIUM.

## 2024-03-11 ENCOUNTER — OFFICE VISIT (OUTPATIENT)
Dept: OBGYN CLINIC | Age: 39
End: 2024-03-11

## 2024-03-11 VITALS
DIASTOLIC BLOOD PRESSURE: 74 MMHG | WEIGHT: 208.5 LBS | BODY MASS INDEX: 34.74 KG/M2 | SYSTOLIC BLOOD PRESSURE: 118 MMHG | HEIGHT: 65 IN

## 2024-03-11 DIAGNOSIS — Z01.818 PREOP EXAMINATION: Primary | ICD-10-CM

## 2024-03-11 PROCEDURE — PREOPEXAM PRE-OP EXAM: Performed by: OBSTETRICS & GYNECOLOGY

## 2024-03-11 RX ORDER — SODIUM CHLORIDE 0.9 % (FLUSH) 0.9 %
5-40 SYRINGE (ML) INJECTION PRN
Status: CANCELLED | OUTPATIENT
Start: 2024-03-11

## 2024-03-11 RX ORDER — SODIUM CHLORIDE 0.9 % (FLUSH) 0.9 %
5-40 SYRINGE (ML) INJECTION EVERY 12 HOURS SCHEDULED
Status: CANCELLED | OUTPATIENT
Start: 2024-03-11

## 2024-03-11 RX ORDER — SODIUM CHLORIDE 9 MG/ML
INJECTION, SOLUTION INTRAVENOUS PRN
Status: CANCELLED | OUTPATIENT
Start: 2024-03-11
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
## 2024-03-12 ENCOUNTER — ANESTHESIA EVENT (OUTPATIENT)
Dept: SURGERY | Age: 39
End: 2024-03-12
Payer: COMMERCIAL

## 2024-03-21 ENCOUNTER — HOSPITAL ENCOUNTER (INPATIENT)
Age: 39
LOS: 1 days | Discharge: HOME | End: 2024-03-22
Attending: OBSTETRICS & GYNECOLOGY | Admitting: OBSTETRICS & GYNECOLOGY
Payer: COMMERCIAL

## 2024-03-21 ENCOUNTER — ANESTHESIA (OUTPATIENT)
Dept: SURGERY | Age: 39
End: 2024-03-21
Payer: COMMERCIAL

## 2024-03-21 ENCOUNTER — PREP FOR PROCEDURE (OUTPATIENT)
Dept: OBGYN CLINIC | Age: 39
End: 2024-03-21

## 2024-03-21 DIAGNOSIS — D21.9 FIBROIDS: ICD-10-CM

## 2024-03-21 DIAGNOSIS — N85.2 ENLARGED UTERUS: ICD-10-CM

## 2024-03-21 DIAGNOSIS — Z01.818 PRE-OP TESTING: ICD-10-CM

## 2024-03-21 DIAGNOSIS — R93.89 THICKENED ENDOMETRIUM: ICD-10-CM

## 2024-03-21 DIAGNOSIS — D21.9 FIBROID: Primary | ICD-10-CM

## 2024-03-21 LAB
ABO + RH BLD: NORMAL
BLOOD GROUP ANTIBODIES SERPL: NORMAL
HCG UR QL: NEGATIVE
SPECIMEN EXP DATE BLD: NORMAL

## 2024-03-21 PROCEDURE — 7100000001 HC PACU RECOVERY - ADDTL 15 MIN: Performed by: OBSTETRICS & GYNECOLOGY

## 2024-03-21 PROCEDURE — 86850 RBC ANTIBODY SCREEN: CPT

## 2024-03-21 PROCEDURE — 2580000003 HC RX 258: Performed by: OBSTETRICS & GYNECOLOGY

## 2024-03-21 PROCEDURE — 81025 URINE PREGNANCY TEST: CPT

## 2024-03-21 PROCEDURE — 6370000000 HC RX 637 (ALT 250 FOR IP): Performed by: ANESTHESIOLOGY

## 2024-03-21 PROCEDURE — 0UT70ZZ RESECTION OF BILATERAL FALLOPIAN TUBES, OPEN APPROACH: ICD-10-PCS | Performed by: OBSTETRICS & GYNECOLOGY

## 2024-03-21 PROCEDURE — 3700000001 HC ADD 15 MINUTES (ANESTHESIA): Performed by: OBSTETRICS & GYNECOLOGY

## 2024-03-21 PROCEDURE — 2580000003 HC RX 258: Performed by: ANESTHESIOLOGY

## 2024-03-21 PROCEDURE — 64488 TAP BLOCK BI INJECTION: CPT | Performed by: ANESTHESIOLOGY

## 2024-03-21 PROCEDURE — 6360000002 HC RX W HCPCS: Performed by: NURSE ANESTHETIST, CERTIFIED REGISTERED

## 2024-03-21 PROCEDURE — 2500000003 HC RX 250 WO HCPCS: Performed by: NURSE ANESTHETIST, CERTIFIED REGISTERED

## 2024-03-21 PROCEDURE — 88307 TISSUE EXAM BY PATHOLOGIST: CPT

## 2024-03-21 PROCEDURE — 0UT20ZZ RESECTION OF BILATERAL OVARIES, OPEN APPROACH: ICD-10-PCS | Performed by: OBSTETRICS & GYNECOLOGY

## 2024-03-21 PROCEDURE — 3700000000 HC ANESTHESIA ATTENDED CARE: Performed by: OBSTETRICS & GYNECOLOGY

## 2024-03-21 PROCEDURE — 6370000000 HC RX 637 (ALT 250 FOR IP): Performed by: OBSTETRICS & GYNECOLOGY

## 2024-03-21 PROCEDURE — 2709999900 HC NON-CHARGEABLE SUPPLY: Performed by: OBSTETRICS & GYNECOLOGY

## 2024-03-21 PROCEDURE — 6360000002 HC RX W HCPCS: Performed by: ANESTHESIOLOGY

## 2024-03-21 PROCEDURE — 6360000002 HC RX W HCPCS: Performed by: OBSTETRICS & GYNECOLOGY

## 2024-03-21 PROCEDURE — 2720000010 HC SURG SUPPLY STERILE: Performed by: OBSTETRICS & GYNECOLOGY

## 2024-03-21 PROCEDURE — 58150 TOTAL HYSTERECTOMY: CPT | Performed by: OBSTETRICS & GYNECOLOGY

## 2024-03-21 PROCEDURE — 2500000003 HC RX 250 WO HCPCS: Performed by: ANESTHESIOLOGY

## 2024-03-21 PROCEDURE — 1100000000 HC RM PRIVATE

## 2024-03-21 PROCEDURE — 7100000000 HC PACU RECOVERY - FIRST 15 MIN: Performed by: OBSTETRICS & GYNECOLOGY

## 2024-03-21 PROCEDURE — 3600000014 HC SURGERY LEVEL 4 ADDTL 15MIN: Performed by: OBSTETRICS & GYNECOLOGY

## 2024-03-21 PROCEDURE — 86901 BLOOD TYPING SEROLOGIC RH(D): CPT

## 2024-03-21 PROCEDURE — 0UT90ZZ RESECTION OF UTERUS, OPEN APPROACH: ICD-10-PCS | Performed by: OBSTETRICS & GYNECOLOGY

## 2024-03-21 PROCEDURE — 86900 BLOOD TYPING SEROLOGIC ABO: CPT

## 2024-03-21 PROCEDURE — 3600000004 HC SURGERY LEVEL 4 BASE: Performed by: OBSTETRICS & GYNECOLOGY

## 2024-03-21 RX ORDER — MIDAZOLAM HYDROCHLORIDE 2 MG/2ML
2 INJECTION, SOLUTION INTRAMUSCULAR; INTRAVENOUS
Status: DISCONTINUED | OUTPATIENT
Start: 2024-03-21 | End: 2024-03-21 | Stop reason: HOSPADM

## 2024-03-21 RX ORDER — OXYCODONE HYDROCHLORIDE 5 MG/1
10 TABLET ORAL EVERY 4 HOURS PRN
Status: DISCONTINUED | OUTPATIENT
Start: 2024-03-21 | End: 2024-03-22 | Stop reason: HOSPADM

## 2024-03-21 RX ORDER — SCOLOPAMINE TRANSDERMAL SYSTEM 1 MG/1
1 PATCH, EXTENDED RELEASE TRANSDERMAL ONCE
Status: DISCONTINUED | OUTPATIENT
Start: 2024-03-21 | End: 2024-03-21

## 2024-03-21 RX ORDER — KETOROLAC TROMETHAMINE 30 MG/ML
INJECTION, SOLUTION INTRAMUSCULAR; INTRAVENOUS PRN
Status: DISCONTINUED | OUTPATIENT
Start: 2024-03-21 | End: 2024-03-21 | Stop reason: SDUPTHER

## 2024-03-21 RX ORDER — DEXAMETHASONE SODIUM PHOSPHATE 10 MG/ML
INJECTION INTRAMUSCULAR; INTRAVENOUS PRN
Status: DISCONTINUED | OUTPATIENT
Start: 2024-03-21 | End: 2024-03-21 | Stop reason: SDUPTHER

## 2024-03-21 RX ORDER — ONDANSETRON 2 MG/ML
4 INJECTION INTRAMUSCULAR; INTRAVENOUS
Status: COMPLETED | OUTPATIENT
Start: 2024-03-21 | End: 2024-03-21

## 2024-03-21 RX ORDER — SODIUM CHLORIDE 9 MG/ML
INJECTION, SOLUTION INTRAVENOUS PRN
Status: DISCONTINUED | OUTPATIENT
Start: 2024-03-21 | End: 2024-03-21 | Stop reason: HOSPADM

## 2024-03-21 RX ORDER — MIDAZOLAM HYDROCHLORIDE 1 MG/ML
INJECTION INTRAMUSCULAR; INTRAVENOUS PRN
Status: DISCONTINUED | OUTPATIENT
Start: 2024-03-21 | End: 2024-03-21 | Stop reason: SDUPTHER

## 2024-03-21 RX ORDER — NALOXONE HYDROCHLORIDE 0.4 MG/ML
INJECTION, SOLUTION INTRAMUSCULAR; INTRAVENOUS; SUBCUTANEOUS PRN
Status: DISCONTINUED | OUTPATIENT
Start: 2024-03-21 | End: 2024-03-21 | Stop reason: HOSPADM

## 2024-03-21 RX ORDER — PROMETHAZINE HYDROCHLORIDE 25 MG/1
12.5 TABLET ORAL EVERY 6 HOURS PRN
Status: DISCONTINUED | OUTPATIENT
Start: 2024-03-21 | End: 2024-03-22 | Stop reason: HOSPADM

## 2024-03-21 RX ORDER — KETOROLAC TROMETHAMINE 30 MG/ML
30 INJECTION, SOLUTION INTRAMUSCULAR; INTRAVENOUS EVERY 6 HOURS
Status: COMPLETED | OUTPATIENT
Start: 2024-03-21 | End: 2024-03-22

## 2024-03-21 RX ORDER — SODIUM CHLORIDE, SODIUM LACTATE, POTASSIUM CHLORIDE, CALCIUM CHLORIDE 600; 310; 30; 20 MG/100ML; MG/100ML; MG/100ML; MG/100ML
INJECTION, SOLUTION INTRAVENOUS CONTINUOUS
Status: DISCONTINUED | OUTPATIENT
Start: 2024-03-21 | End: 2024-03-21 | Stop reason: HOSPADM

## 2024-03-21 RX ORDER — PROCHLORPERAZINE EDISYLATE 5 MG/ML
5 INJECTION INTRAMUSCULAR; INTRAVENOUS
Status: DISCONTINUED | OUTPATIENT
Start: 2024-03-21 | End: 2024-03-21 | Stop reason: HOSPADM

## 2024-03-21 RX ORDER — OXYCODONE HYDROCHLORIDE 5 MG/1
5 TABLET ORAL EVERY 4 HOURS PRN
Status: DISCONTINUED | OUTPATIENT
Start: 2024-03-21 | End: 2024-03-22 | Stop reason: HOSPADM

## 2024-03-21 RX ORDER — MORPHINE SULFATE 2 MG/ML
2 INJECTION, SOLUTION INTRAMUSCULAR; INTRAVENOUS
Status: DISCONTINUED | OUTPATIENT
Start: 2024-03-21 | End: 2024-03-22 | Stop reason: HOSPADM

## 2024-03-21 RX ORDER — LIDOCAINE HYDROCHLORIDE 20 MG/ML
INJECTION, SOLUTION EPIDURAL; INFILTRATION; INTRACAUDAL; PERINEURAL PRN
Status: DISCONTINUED | OUTPATIENT
Start: 2024-03-21 | End: 2024-03-21 | Stop reason: SDUPTHER

## 2024-03-21 RX ORDER — PROPOFOL 10 MG/ML
INJECTION, EMULSION INTRAVENOUS PRN
Status: DISCONTINUED | OUTPATIENT
Start: 2024-03-21 | End: 2024-03-21 | Stop reason: SDUPTHER

## 2024-03-21 RX ORDER — ONDANSETRON 2 MG/ML
INJECTION INTRAMUSCULAR; INTRAVENOUS PRN
Status: DISCONTINUED | OUTPATIENT
Start: 2024-03-21 | End: 2024-03-21 | Stop reason: SDUPTHER

## 2024-03-21 RX ORDER — HALOPERIDOL 5 MG/ML
1 INJECTION INTRAMUSCULAR
Status: DISCONTINUED | OUTPATIENT
Start: 2024-03-21 | End: 2024-03-21 | Stop reason: HOSPADM

## 2024-03-21 RX ORDER — SODIUM CHLORIDE 0.9 % (FLUSH) 0.9 %
5-40 SYRINGE (ML) INJECTION EVERY 12 HOURS SCHEDULED
Status: DISCONTINUED | OUTPATIENT
Start: 2024-03-21 | End: 2024-03-21 | Stop reason: HOSPADM

## 2024-03-21 RX ORDER — GLYCOPYRROLATE 0.2 MG/ML
INJECTION INTRAMUSCULAR; INTRAVENOUS PRN
Status: DISCONTINUED | OUTPATIENT
Start: 2024-03-21 | End: 2024-03-21 | Stop reason: SDUPTHER

## 2024-03-21 RX ORDER — SODIUM CHLORIDE 0.9 % (FLUSH) 0.9 %
5-40 SYRINGE (ML) INJECTION PRN
Status: DISCONTINUED | OUTPATIENT
Start: 2024-03-21 | End: 2024-03-21 | Stop reason: HOSPADM

## 2024-03-21 RX ORDER — BUPIVACAINE HYDROCHLORIDE AND EPINEPHRINE 2.5; 5 MG/ML; UG/ML
INJECTION, SOLUTION EPIDURAL; INFILTRATION; INTRACAUDAL; PERINEURAL
Status: COMPLETED | OUTPATIENT
Start: 2024-03-21 | End: 2024-03-21

## 2024-03-21 RX ORDER — IBUPROFEN 800 MG/1
800 TABLET ORAL EVERY 8 HOURS
Status: DISCONTINUED | OUTPATIENT
Start: 2024-03-22 | End: 2024-03-22 | Stop reason: HOSPADM

## 2024-03-21 RX ORDER — FENTANYL CITRATE 50 UG/ML
INJECTION, SOLUTION INTRAMUSCULAR; INTRAVENOUS PRN
Status: DISCONTINUED | OUTPATIENT
Start: 2024-03-21 | End: 2024-03-21 | Stop reason: SDUPTHER

## 2024-03-21 RX ORDER — FENTANYL CITRATE 50 UG/ML
100 INJECTION, SOLUTION INTRAMUSCULAR; INTRAVENOUS
Status: DISCONTINUED | OUTPATIENT
Start: 2024-03-21 | End: 2024-03-21 | Stop reason: HOSPADM

## 2024-03-21 RX ORDER — ACETAMINOPHEN 500 MG
1000 TABLET ORAL ONCE
Status: COMPLETED | OUTPATIENT
Start: 2024-03-21 | End: 2024-03-21

## 2024-03-21 RX ORDER — SODIUM CHLORIDE 0.9 % (FLUSH) 0.9 %
5-40 SYRINGE (ML) INJECTION EVERY 12 HOURS SCHEDULED
Status: DISCONTINUED | OUTPATIENT
Start: 2024-03-21 | End: 2024-03-22 | Stop reason: HOSPADM

## 2024-03-21 RX ORDER — ACETAMINOPHEN 500 MG
1000 TABLET ORAL EVERY 8 HOURS SCHEDULED
Status: DISCONTINUED | OUTPATIENT
Start: 2024-03-21 | End: 2024-03-22 | Stop reason: HOSPADM

## 2024-03-21 RX ORDER — KETAMINE HCL IN NACL, ISO-OSM 20 MG/2 ML
SYRINGE (ML) INJECTION PRN
Status: DISCONTINUED | OUTPATIENT
Start: 2024-03-21 | End: 2024-03-21 | Stop reason: SDUPTHER

## 2024-03-21 RX ORDER — DEXTROSE, SODIUM CHLORIDE, SODIUM LACTATE, POTASSIUM CHLORIDE, AND CALCIUM CHLORIDE 5; .6; .31; .03; .02 G/100ML; G/100ML; G/100ML; G/100ML; G/100ML
INJECTION, SOLUTION INTRAVENOUS CONTINUOUS
Status: DISCONTINUED | OUTPATIENT
Start: 2024-03-21 | End: 2024-03-22 | Stop reason: HOSPADM

## 2024-03-21 RX ORDER — ONDANSETRON 2 MG/ML
4 INJECTION INTRAMUSCULAR; INTRAVENOUS EVERY 6 HOURS PRN
Status: DISCONTINUED | OUTPATIENT
Start: 2024-03-21 | End: 2024-03-22 | Stop reason: HOSPADM

## 2024-03-21 RX ORDER — EPHEDRINE SULFATE/0.9% NACL/PF 50 MG/5 ML
SYRINGE (ML) INTRAVENOUS PRN
Status: DISCONTINUED | OUTPATIENT
Start: 2024-03-21 | End: 2024-03-21 | Stop reason: SDUPTHER

## 2024-03-21 RX ORDER — HYDROMORPHONE HYDROCHLORIDE 1 MG/ML
0.5 INJECTION, SOLUTION INTRAMUSCULAR; INTRAVENOUS; SUBCUTANEOUS EVERY 5 MIN PRN
Status: COMPLETED | OUTPATIENT
Start: 2024-03-21 | End: 2024-03-21

## 2024-03-21 RX ORDER — MORPHINE SULFATE 4 MG/ML
4 INJECTION, SOLUTION INTRAMUSCULAR; INTRAVENOUS
Status: DISCONTINUED | OUTPATIENT
Start: 2024-03-21 | End: 2024-03-22 | Stop reason: HOSPADM

## 2024-03-21 RX ORDER — DEXAMETHASONE SODIUM PHOSPHATE 10 MG/ML
INJECTION, SOLUTION INTRAMUSCULAR; INTRAVENOUS
Status: COMPLETED | OUTPATIENT
Start: 2024-03-21 | End: 2024-03-21

## 2024-03-21 RX ORDER — OXYCODONE HYDROCHLORIDE 5 MG/1
5 TABLET ORAL
Status: COMPLETED | OUTPATIENT
Start: 2024-03-21 | End: 2024-03-21

## 2024-03-21 RX ORDER — LIDOCAINE HYDROCHLORIDE 10 MG/ML
1 INJECTION, SOLUTION INFILTRATION; PERINEURAL
Status: DISCONTINUED | OUTPATIENT
Start: 2024-03-21 | End: 2024-03-21 | Stop reason: HOSPADM

## 2024-03-21 RX ORDER — SODIUM CHLORIDE 0.9 % (FLUSH) 0.9 %
5-40 SYRINGE (ML) INJECTION PRN
Status: DISCONTINUED | OUTPATIENT
Start: 2024-03-21 | End: 2024-03-22 | Stop reason: HOSPADM

## 2024-03-21 RX ORDER — ROCURONIUM BROMIDE 10 MG/ML
INJECTION, SOLUTION INTRAVENOUS PRN
Status: DISCONTINUED | OUTPATIENT
Start: 2024-03-21 | End: 2024-03-21 | Stop reason: SDUPTHER

## 2024-03-21 RX ORDER — SODIUM CHLORIDE 9 MG/ML
INJECTION, SOLUTION INTRAVENOUS PRN
Status: DISCONTINUED | OUTPATIENT
Start: 2024-03-21 | End: 2024-03-22 | Stop reason: HOSPADM

## 2024-03-21 RX ORDER — NEOSTIGMINE METHYLSULFATE 1 MG/ML
INJECTION, SOLUTION INTRAVENOUS PRN
Status: DISCONTINUED | OUTPATIENT
Start: 2024-03-21 | End: 2024-03-21 | Stop reason: SDUPTHER

## 2024-03-21 RX ADMIN — LIDOCAINE HYDROCHLORIDE 80 MG: 20 INJECTION, SOLUTION EPIDURAL; INFILTRATION; INTRACAUDAL; PERINEURAL at 07:18

## 2024-03-21 RX ADMIN — SODIUM CHLORIDE, SODIUM LACTATE, POTASSIUM CHLORIDE, CALCIUM CHLORIDE AND DEXTROSE MONOHYDRATE: 5; 600; 310; 30; 20 INJECTION, SOLUTION INTRAVENOUS at 10:45

## 2024-03-21 RX ADMIN — Medication 30 MG: at 07:18

## 2024-03-21 RX ADMIN — ONDANSETRON 4 MG: 2 INJECTION INTRAMUSCULAR; INTRAVENOUS at 09:51

## 2024-03-21 RX ADMIN — ACETAMINOPHEN 1000 MG: 500 TABLET, FILM COATED ORAL at 14:10

## 2024-03-21 RX ADMIN — ACETAMINOPHEN 1000 MG: 500 TABLET, FILM COATED ORAL at 06:18

## 2024-03-21 RX ADMIN — HYDROMORPHONE HYDROCHLORIDE 0.5 MG: 1 INJECTION, SOLUTION INTRAMUSCULAR; INTRAVENOUS; SUBCUTANEOUS at 09:25

## 2024-03-21 RX ADMIN — GLYCOPYRROLATE 0.5 MG: 0.2 INJECTION INTRAMUSCULAR; INTRAVENOUS at 08:53

## 2024-03-21 RX ADMIN — ROCURONIUM BROMIDE 10 MG: 10 INJECTION, SOLUTION INTRAVENOUS at 07:48

## 2024-03-21 RX ADMIN — SODIUM CHLORIDE, SODIUM LACTATE, POTASSIUM CHLORIDE, AND CALCIUM CHLORIDE: 600; 310; 30; 20 INJECTION, SOLUTION INTRAVENOUS at 08:06

## 2024-03-21 RX ADMIN — MIDAZOLAM 2 MG: 1 INJECTION INTRAMUSCULAR; INTRAVENOUS at 07:09

## 2024-03-21 RX ADMIN — KETOROLAC TROMETHAMINE 30 MG: 30 INJECTION INTRAMUSCULAR; INTRAVENOUS at 14:10

## 2024-03-21 RX ADMIN — OXYCODONE 10 MG: 5 TABLET ORAL at 14:07

## 2024-03-21 RX ADMIN — DEXAMETHASONE SODIUM PHOSPHATE 5 MG: 10 INJECTION, SOLUTION INTRAMUSCULAR; INTRAVENOUS at 07:24

## 2024-03-21 RX ADMIN — Medication 10 MG: at 08:04

## 2024-03-21 RX ADMIN — KETOROLAC TROMETHAMINE 30 MG: 30 INJECTION, SOLUTION INTRAMUSCULAR at 08:40

## 2024-03-21 RX ADMIN — MORPHINE SULFATE 4 MG: 4 INJECTION, SOLUTION INTRAMUSCULAR; INTRAVENOUS at 10:52

## 2024-03-21 RX ADMIN — OXYCODONE 5 MG: 5 TABLET ORAL at 09:48

## 2024-03-21 RX ADMIN — Medication 10 MG: at 08:19

## 2024-03-21 RX ADMIN — BUPIVACAINE HYDROCHLORIDE AND EPINEPHRINE BITARTRATE 20 ML: 2.5; .005 INJECTION, SOLUTION EPIDURAL; INFILTRATION; INTRACAUDAL; PERINEURAL at 07:20

## 2024-03-21 RX ADMIN — ONDANSETRON 4 MG: 2 INJECTION INTRAMUSCULAR; INTRAVENOUS at 08:22

## 2024-03-21 RX ADMIN — BUPIVACAINE HYDROCHLORIDE AND EPINEPHRINE 30 ML: 2.5; 5 INJECTION, SOLUTION EPIDURAL; INFILTRATION; INTRACAUDAL; PERINEURAL at 07:24

## 2024-03-21 RX ADMIN — DEXAMETHASONE SODIUM PHOSPHATE 5 MG: 10 INJECTION, SOLUTION INTRAMUSCULAR; INTRAVENOUS at 07:20

## 2024-03-21 RX ADMIN — SODIUM CHLORIDE, SODIUM LACTATE, POTASSIUM CHLORIDE, AND CALCIUM CHLORIDE: 600; 310; 30; 20 INJECTION, SOLUTION INTRAVENOUS at 06:17

## 2024-03-21 RX ADMIN — Medication 10 MG: at 07:32

## 2024-03-21 RX ADMIN — SODIUM CHLORIDE, SODIUM LACTATE, POTASSIUM CHLORIDE, CALCIUM CHLORIDE AND DEXTROSE MONOHYDRATE: 5; 600; 310; 30; 20 INJECTION, SOLUTION INTRAVENOUS at 20:01

## 2024-03-21 RX ADMIN — KETOROLAC TROMETHAMINE 30 MG: 30 INJECTION INTRAMUSCULAR; INTRAVENOUS at 20:02

## 2024-03-21 RX ADMIN — HYDROMORPHONE HYDROCHLORIDE 0.5 MG: 1 INJECTION, SOLUTION INTRAMUSCULAR; INTRAVENOUS; SUBCUTANEOUS at 09:35

## 2024-03-21 RX ADMIN — FENTANYL CITRATE 100 MCG: 50 INJECTION, SOLUTION INTRAMUSCULAR; INTRAVENOUS at 07:18

## 2024-03-21 RX ADMIN — ROCURONIUM BROMIDE 50 MG: 10 INJECTION, SOLUTION INTRAVENOUS at 07:18

## 2024-03-21 RX ADMIN — Medication 5 MG: at 08:53

## 2024-03-21 RX ADMIN — DEXAMETHASONE SODIUM PHOSPHATE 8 MG: 10 INJECTION INTRAMUSCULAR; INTRAVENOUS at 08:22

## 2024-03-21 RX ADMIN — PROPOFOL 200 MG: 10 INJECTION, EMULSION INTRAVENOUS at 07:18

## 2024-03-21 RX ADMIN — ACETAMINOPHEN 1000 MG: 500 TABLET, FILM COATED ORAL at 22:28

## 2024-03-21 ASSESSMENT — PAIN DESCRIPTION - ORIENTATION
ORIENTATION: LOWER

## 2024-03-21 ASSESSMENT — PAIN SCALES - GENERAL
PAINLEVEL_OUTOF10: 5
PAINLEVEL_OUTOF10: 7
PAINLEVEL_OUTOF10: 5
PAINLEVEL_OUTOF10: 4
PAINLEVEL_OUTOF10: 2
PAINLEVEL_OUTOF10: 4
PAINLEVEL_OUTOF10: 5
PAINLEVEL_OUTOF10: 8
PAINLEVEL_OUTOF10: 10
PAINLEVEL_OUTOF10: 8
PAINLEVEL_OUTOF10: 7
PAINLEVEL_OUTOF10: 5
PAINLEVEL_OUTOF10: 5
PAINLEVEL_OUTOF10: 4
PAINLEVEL_OUTOF10: 7
PAINLEVEL_OUTOF10: 4
PAINLEVEL_OUTOF10: 7

## 2024-03-21 ASSESSMENT — PAIN DESCRIPTION - LOCATION
LOCATION: ABDOMEN

## 2024-03-21 ASSESSMENT — PAIN DESCRIPTION - DESCRIPTORS
DESCRIPTORS: DISCOMFORT;SHARP
DESCRIPTORS: ACHING
DESCRIPTORS: ACHING
DESCRIPTORS: SHARP;ACHING
DESCRIPTORS: DISCOMFORT

## 2024-03-21 ASSESSMENT — PAIN - FUNCTIONAL ASSESSMENT: PAIN_FUNCTIONAL_ASSESSMENT: 0-10

## 2024-03-21 ASSESSMENT — PAIN DESCRIPTION - ONSET: ONSET: AWAKENED FROM SLEEP

## 2024-03-21 ASSESSMENT — PAIN DESCRIPTION - FREQUENCY: FREQUENCY: CONTINUOUS

## 2024-03-21 ASSESSMENT — PAIN DESCRIPTION - PAIN TYPE: TYPE: SURGICAL PAIN

## 2024-03-21 NOTE — ANESTHESIA POSTPROCEDURE EVALUATION
Department of Anesthesiology  Postprocedure Note    Patient: Rahel Rangel  MRN: [id number]  YOB: 1985  Date of evaluation: 3/21/2024    Procedure Summary       Date: 03/21/24 Room / Location: [facility] MAIN OR 02 / [facility] MAIN OR    Anesthesia Start: 0708 Anesthesia Stop: 0916    Procedure: ERAS/ TOTAL ABDOMINAL HYSTERECTOMY/ BILATERAL SALPINGECTOMY (Pelvis) Diagnosis:       Enlarged uterus      Fibroids      Thickened endometrium      (Enlarged uterus [N85.2])      (Fibroids [D21.9])      (Thickened endometrium [R93.89])    Surgeons: Fely Marinelli MD Responsible Provider: Gaurang Garcia MD    Anesthesia Type: general ASA Status: 2            Anesthesia Type: No value filed.    Isidro Phase I: Isidro Score: 10    Isidro Phase II:      Anesthesia Post Evaluation    Patient location during evaluation: PACU  Patient participation: complete - patient participated  Level of consciousness: awake and alert  Pain score: 3  Airway patency: patent  Nausea & Vomiting: no nausea  Cardiovascular status: blood pressure returned to baseline and hemodynamically stable  Respiratory status: acceptable  Hydration status: euvolemic  Multimodal analgesia pain management approach  Pain management: adequate and satisfactory to patient    No notable events documented.

## 2024-03-21 NOTE — ANESTHESIA PROCEDURE NOTES
Peripheral Block    Patient location during procedure: OR  Reason for block: post-op pain management and at surgeon's request  Start time: 3/21/2024 7:20 AM  End time: 3/21/2024 7:24 AM  Staffing  Performed: anesthesiologist   Anesthesiologist: Gaurang Garcia MD  Performed by: Gaurang Garcia MD  Authorized by: Gaurang Garcia MD    Preanesthetic Checklist  Completed: patient identified, IV checked, site marked, risks and benefits discussed, surgical/procedural consents, equipment checked, pre-op evaluation, anesthesia consent given, oxygen available, monitors applied/VS acknowledged and blood product R/B/A discussed and consented  Peripheral Block   Patient position: supine  Prep: ChloraPrep  Provider prep: sterile gloves and mask  Patient monitoring: cardiac monitor, continuous pulse ox, continuous capnometry, frequent blood pressure checks, oxygen and IV access  Block type: Rectus sheath  Laterality: bilateral  Injection technique: single-shot  Guidance: ultrasound guided    Needle   Needle type: insulated echogenic nerve stimulator needle   Needle gauge: 21 G  Needle localization: ultrasound guidance and anatomical landmarks  Needle length: 10 cm  Assessment   Injection assessment: negative aspiration for heme, no paresthesia on injection and no intravascular symptoms  Paresthesia pain: none  Slow fractionated injection: yes  Hemodynamics: stable  Outcomes: uncomplicated and patient tolerated procedure well    Medications Administered  BUPivacaine 0.25%-EPINEPHrine injection [id number] - Perineural   20 mL - 3/21/2024 7:20:00 AM  dexAMETHasone (DECADRON) (PF) 10 mg/mL injection - Other   5 mg - 3/21/2024 7:20:00 AM

## 2024-03-21 NOTE — OP NOTE
TOTAL ABDOMINAL HYSTERECTOMY-BSO      PATIENT: Rahel Rangel  MRN: [id number]      DATE OF PROCEDURE:   3/21/2024     PREOPERATIVE DIAGNOSIS:  Enlarged uterus [N85.2]  Fibroids [D21.9]  Thickened endometrium [R93.89]  Dysplasia high grade  POSTOPERATIVE DIAGNOSIS:  MARY with adenomyosis    PROCEDURE:   Total abdominal hysterectomy bilateral salpingoophorectomy    SURGEON:    Fely Marinelli MD    ASSISTANTS:    Shane    Pathology: uterus tubes cervix    FINDINGS:  Enlarged boggy fibroid uterus normal ovaries normal tubes   IVF: 1300  UOP: 350  EBL 50      PROCEDURE IN DETAIL: Time out was done to confirm the operating procedure, surgeon, patient and site.  Once confirmed by the team, procedure was started. The patient was anesthetized using  CAMERON and general  . After satisfactory anesthesia was obtained, she was prepped and draped in sterile fashion for an abdominal procedure with a Zepeda catheter in place. A Pfannenstiel incision was made in the skin and carried down through the subcutaneous tissue, fascia and peritoneum, into the peritoneal cavity. The uterus was elevated thru the incision and the bowel was packed away. Attention turned toward the tubes, the enseal device was used to coagulated and incise the mesosalpinx until tube detatched and removed bilaterally from the ovaries.   The uterus was manipulated manually until the uterine ovarian pedicles could be isolated and  coagulated and dissected until the ovaries were free from the uterus.   The round ligaments were clamped, divided and ligated with enseal.   The visceral peritoneum was incised across the lower uterine segment with bladder flap being developed sharply.  The uterine vessels were skeletonized, clamped, divided and ligated with the enseal. The remainder of the cardinal and uterosacral ligaments were clamped, divided and suture ligated in step-wise fashion.  The vaginal cuff was incised circumferentially about the cervix and uterus

## 2024-03-21 NOTE — PERIOP NOTE
TRANSFER - OUT REPORT:    Verbal report given to Jacob OWEN on Rahel Rangel  being transferred to Room 344 for routine progression of patient care       Report consisted of patient's Situation, Background, Assessment and   Recommendations(SBAR).     Information from the following report(s) Nurse Handoff Report, Adult Overview, and MAR was reviewed with the receiving nurse.           Lines:   Peripheral IV 03/21/24 Posterior;Right Hand (Active)   Site Assessment Clean, dry & intact 03/21/24 0915   Line Status Infusing 03/21/24 0915   Line Care Connections checked and tightened 03/21/24 0915   Phlebitis Assessment No symptoms 03/21/24 0915   Infiltration Assessment 0 03/21/24 0915   Dressing Status Clean, dry & intact 03/21/24 0915   Dressing Type Transparent 03/21/24 0915        Opportunity for questions and clarification was provided.      Patient transported with:  O2 @ 0lpm

## 2024-03-21 NOTE — ANESTHESIA PRE PROCEDURE
Evaluation  Patient summary reviewed and Nursing notes reviewed  Airway: Mallampati: II  TM distance: >3 FB   Neck ROM: full  Mouth opening: > = 3 FB   Dental: normal exam         Pulmonary:Negative Pulmonary ROS breath sounds clear to auscultation                             Cardiovascular:  Exercise tolerance: good (>4 METS)          Rhythm: regular  Rate: normal                    Neuro/Psych:   (+) psychiatric history: stable with treatmentdepression/anxiety             GI/Hepatic/Renal: Neg GI/Hepatic/Renal ROS            Endo/Other: Negative Endo/Other ROS                    Abdominal:             Vascular: negative vascular ROS.         Other Findings:       Anesthesia Plan      general     ASA 2     (Abdominal nerve blocks)  Induction: intravenous.    MIPS: Postoperative opioids intended and Prophylactic antiemetics administered.  Anesthetic plan and risks discussed with patient and mother.    Use of blood products discussed with patient whom consented to blood products.            Post-op pain plan if not by surgeon: single peripheral nerve block        Gaurang Garcia MD   3/21/2024

## 2024-03-21 NOTE — ANESTHESIA PROCEDURE NOTES
Peripheral Block    Patient location during procedure: OR  Reason for block: post-op pain management and at surgeon's request  Start time: 3/21/2024 7:24 AM  End time: 3/21/2024 7:27 AM  Staffing  Performed: anesthesiologist   Anesthesiologist: Gaurang Garcia MD  Performed by: Gaurang Garcia MD  Authorized by: Gaurang Garcia MD    Preanesthetic Checklist  Completed: patient identified, IV checked, site marked, risks and benefits discussed, surgical/procedural consents, equipment checked, pre-op evaluation, anesthesia consent given, oxygen available, monitors applied/VS acknowledged and blood product R/B/A discussed and consented  Peripheral Block   Patient position: supine  Prep: ChloraPrep  Provider prep: sterile gloves and mask  Patient monitoring: cardiac monitor, continuous pulse ox, continuous capnometry, frequent blood pressure checks, oxygen and IV access  Block type: TAP  Laterality: bilateral  Injection technique: single-shot  Guidance: ultrasound guided    Needle   Needle type: insulated echogenic nerve stimulator needle   Needle gauge: 21 G  Needle localization: ultrasound guidance and anatomical landmarks  Needle length: 10 cm  Assessment   Injection assessment: negative aspiration for heme, no paresthesia on injection and no intravascular symptoms  Paresthesia pain: none  Slow fractionated injection: yes  Hemodynamics: stable  Outcomes: uncomplicated and patient tolerated procedure well    Medications Administered  dexAMETHasone (DECADRON) (PF) 10 mg/mL injection - Other   5 mg - 3/21/2024 7:24:00 AM  BUPivacaine 0.25%-EPINEPHrine injection 1:200000 - Perineural   30 mL - 3/21/2024 7:24:00 AM

## 2024-03-21 NOTE — PROGRESS NOTES
TRANSFER - IN REPORT:    Verbal report received from Carito OWEN on Rahel Rangel  being received from PACU for routine post-op      Report consisted of patient's Situation, Background, Assessment and   Recommendations(SBAR).     Information from the following report(s) Nurse Handoff Report, Surgery Report, and MAR was reviewed with the receiving nurse.    Opportunity for questions and clarification was provided.      Assessment completed upon patient's arrival to unit and care assumed.

## 2024-03-21 NOTE — PERIOP NOTE
MD Garcia at bedside with patient. Pt VS stable. Pain and nausea controlled at this time. Verbal signout per MD when PACU care is completed. Plan of care continues.

## 2024-03-21 NOTE — CARE COORDINATION
RAHUL HAWK met with pt, family, and friend at bedside for initial CM assessment. Pt admitted with fibroids. Pt alert and oriented x 4. Demographics and insurance discussed and verified. SW updated emergency contact phone number and relationship. Pt reports not current with PCP. RAHUL asked if pt is open to referral to [facility] and location preference. Pt requested [address] location. Pt lives with 3 adolescent children in a 2 level home on the 2nd level with 20 stairs and a rail on the left side. Pt reported local supportive family and friends able to assist. Pt works full-time for [employer], independent with ADLs, and an active  in the community PTA. No DME or HH reported in the home. CMI, Milestones, and ACP completed.      RAHUL sent PCP referral to [facility] on [address]     CM team will continue to follow pt plan of care and assist with any supportive care needs that arise as appropriate.        03/21/24 1533   Service Assessment   Patient Orientation Alert and Oriented;Person;Place;Situation;Self   Cognition Alert   History Provided By Patient   Primary Caregiver Self   Accompanied By/Relationship Mother, sister, and friend   Support Systems Parent;Children;Family Members;Friends/Neighbors   Patient's Healthcare Decision Maker is: Legal Next of Kin   PCP Verified by CM No   Last Visit to PCP   (n/a)   Prior Functional Level Independent in ADLs/IADLs   Current Functional Level Independent in ADLs/IADLs   Can patient return to prior living arrangement Yes   Ability to make needs known: Good   Family able to assist with home care needs: Yes   Would you like for me to discuss the discharge plan with any other family members/significant others, and if so, who? Yes  (Mother Yisel Rangel)   Financial Resources Other (Comment)  ([employer])   Community Resources None   CM/SW Referral No PCP   Social/Functional History   Lives With Family   Type of Home House   Home Layout Two level   Home Access Level entry   Bathroom

## 2024-03-21 NOTE — ACP (ADVANCE CARE PLANNING)
Advance Care Planning     General Advance Care Planning (ACP) Conversation    Date of Conversation: 1/29/2024  Conducted with: Patient with Decision Making Capacity    Healthcare Decision Maker:    Primary Decision Maker: Juan Carlos Morillo [facility] - [phone]  Click here to complete Healthcare Decision Makers including selection of the Healthcare Decision Maker Relationship (ie \"Primary\").  Today we documented Decision Maker(s) consistent with Legal Next of Kin hierarchy.    Content/Action Overview:  No LW HCPOA on file. Mother (Yisel) legal NOK unless documentation provided.    Code status preferences to be discussed with admitting provider.     Length of Voluntary ACP Conversation in minutes:  <16 minutes (Non-Billable)    STEFAN HORAN

## 2024-03-22 VITALS
WEIGHT: 212.3 LBS | TEMPERATURE: 98.1 F | BODY MASS INDEX: 35.37 KG/M2 | OXYGEN SATURATION: 100 % | HEART RATE: 56 BPM | RESPIRATION RATE: 16 BRPM | SYSTOLIC BLOOD PRESSURE: 112 MMHG | DIASTOLIC BLOOD PRESSURE: 67 MMHG | HEIGHT: 65 IN

## 2024-03-22 LAB
ERYTHROCYTE [DISTWIDTH] IN BLOOD BY AUTOMATED COUNT: 13.1 % (ref 11.9–14.6)
HCT VFR BLD AUTO: 33 % (ref 35.8–46.3)
HGB BLD-MCNC: 10.7 G/DL (ref 11.7–15.4)
MCH RBC QN AUTO: 29.2 PG (ref 26.1–32.9)
MCHC RBC AUTO-ENTMCNC: 32.4 G/DL (ref 31.4–35)
MCV RBC AUTO: 90.2 FL (ref 82–102)
NRBC # BLD: 0 K/UL (ref 0–0.2)
PLATELET # BLD AUTO: 218 K/UL (ref 150–450)
PMV BLD AUTO: 10.8 FL (ref 9.4–12.3)
RBC # BLD AUTO: 3.66 M/UL (ref 4.05–5.2)
WBC # BLD AUTO: 11.3 K/UL (ref 4.3–11.1)

## 2024-03-22 PROCEDURE — 85027 COMPLETE CBC AUTOMATED: CPT

## 2024-03-22 PROCEDURE — 2580000003 HC RX 258: Performed by: OBSTETRICS & GYNECOLOGY

## 2024-03-22 PROCEDURE — 36415 COLL VENOUS BLD VENIPUNCTURE: CPT

## 2024-03-22 PROCEDURE — 6370000000 HC RX 637 (ALT 250 FOR IP): Performed by: OBSTETRICS & GYNECOLOGY

## 2024-03-22 PROCEDURE — 6360000002 HC RX W HCPCS: Performed by: OBSTETRICS & GYNECOLOGY

## 2024-03-22 RX ORDER — OXYCODONE HYDROCHLORIDE 5 MG/1
5 TABLET ORAL EVERY 4 HOURS PRN
Qty: 20 TABLET | Refills: 0 | Status: SHIPPED | OUTPATIENT
Start: 2024-03-22 | End: 2024-03-27

## 2024-03-22 RX ORDER — IBUPROFEN 800 MG/1
800 TABLET ORAL EVERY 8 HOURS
Qty: 60 TABLET | Refills: 3 | Status: SHIPPED | OUTPATIENT
Start: 2024-03-22

## 2024-03-22 RX ADMIN — KETOROLAC TROMETHAMINE 30 MG: 30 INJECTION INTRAMUSCULAR; INTRAVENOUS at 03:28

## 2024-03-22 RX ADMIN — KETOROLAC TROMETHAMINE 30 MG: 30 INJECTION INTRAMUSCULAR; INTRAVENOUS at 07:57

## 2024-03-22 RX ADMIN — SODIUM CHLORIDE, SODIUM LACTATE, POTASSIUM CHLORIDE, CALCIUM CHLORIDE AND DEXTROSE MONOHYDRATE: 5; 600; 310; 30; 20 INJECTION, SOLUTION INTRAVENOUS at 04:10

## 2024-03-22 RX ADMIN — ACETAMINOPHEN 1000 MG: 500 TABLET, FILM COATED ORAL at 04:43

## 2024-03-22 ASSESSMENT — PAIN DESCRIPTION - ORIENTATION
ORIENTATION: LOWER
ORIENTATION: LOWER

## 2024-03-22 ASSESSMENT — PAIN SCALES - GENERAL
PAINLEVEL_OUTOF10: 5
PAINLEVEL_OUTOF10: 5
PAINLEVEL_OUTOF10: 4

## 2024-03-22 ASSESSMENT — PAIN DESCRIPTION - LOCATION
LOCATION: ABDOMEN

## 2024-03-22 ASSESSMENT — PAIN DESCRIPTION - DESCRIPTORS
DESCRIPTORS: ACHING
DESCRIPTORS: DISCOMFORT
DESCRIPTORS: DISCOMFORT

## 2024-03-22 NOTE — DISCHARGE SUMMARY
Gynecological Discharge Summary     Patient ID:  Rahel Rangel  [id number]  38 y.o.  1985    Admit Date: 3/21/2024    Discharge Date: 3/22/2024     Admitting Physician: Fely Marinelli MD     Admission Diagnoses: Enlarged uterus [N85.2]  Fibroids [D21.9]  Thickened endometrium [R93.89]    Discharge Diagnoses: same as above      Additional Diagnoses: none        Hospital Course: By discharge date she was ambulating, voiding and without fever.                               Patient Instructions:   Current Discharge Medication List        START taking these medications    Details   oxyCODONE (ROXICODONE) 5 MG immediate release tablet Take 1 tablet by mouth every 4 hours as needed for Pain for up to 5 days. Max Daily Amount: 30 mg  Qty: 20 tablet, Refills: 0    Comments: Reduce doses taken as pain becomes manageable  Associated Diagnoses: Fibroid      ibuprofen (ADVIL;MOTRIN) 800 MG tablet Take 1 tablet by mouth in the morning and 1 tablet at noon and 1 tablet in the evening.  Qty: 60 tablet, Refills: 3             See discharge instructions provided by nursing.    Follow-up in 2 weeks.    Signed:  Fely Marinelli MD  3/22/2024  8:59 AM

## 2024-03-22 NOTE — PROGRESS NOTES
Gynecology Progress Note    Patient doing well post-op day 1 from [facility] without significant complaints.  Pain controlled on current medication.  Voiding without difficulty. Patient is passing flatus.  Pt states she would like to go home  States she asked nurse at 1800 if she could have her catheter out the nurse told her she would call me. I called floor to check on pt at 1030 last night and the nurse told me she still had catheter- when asked if she saw the order for the catheter to be removed at 1300 she said no.   Vitals:  Blood pressure 112/67, pulse 56, temperature 98.1 °F (36.7 °C), temperature source Oral, resp. rate 16, height 1.651 m (5' 5\"), weight 96.3 kg (212 lb 4.8 oz), SpO2 100 %.  Temp (24hrs), Av °F (36.7 °C), Min:97.6 °F (36.4 °C), Max:98.2 °F (36.8 °C)        Exam:  Patient without distress.               Abdomen soft,  nontender.                 Incision dry and clean without erythema.               Lower extremities are negative for swelling, cords, or tenderness.    Lab/Data Review:  Patient Vitals for the past 72 hrs:   BP Temp Temp src Pulse Resp SpO2 Height Weight   24 0813 112/67 98.1 °F (36.7 °C) Oral 56 16 100 % -- --   24 124/65 98.2 °F (36.8 °C) -- 59 18 99 % -- --   24 1407 125/69 98 °F (36.7 °C) Oral 77 16 98 % -- --   24 1123 118/71 97.6 °F (36.4 °C) Oral 68 16 99 % -- --   24 1019 123/72 98 °F (36.7 °C) Oral 56 16 97 % -- --   24 1000 117/67 97.9 °F (36.6 °C) Infrared 51 23 98 % -- --   24 0955 118/69 -- -- 51 24 98 % -- --   24 0950 116/74 -- -- 53 17 99 % -- --   24 0945 118/64 -- -- 56 16 99 % -- --   24 0940 114/67 -- -- 54 26 97 % -- --   24 0935 118/65 -- -- 55 27 98 % -- --   24 0930 95/76 -- -- 57 24 99 % -- --   24 0925 125/65 -- -- 58 19 98 % -- --   24 0920 115/60 -- -- 60 28 98 % -- --   24 0915 (!) 114/57 98.1 °F (36.7 °C) Skin 60 16 99 % -- --   24 0528 133/75 97.5

## 2024-03-22 NOTE — ANESTHESIA POST-OP
Pt satisfied with care received from anesthesia team. Pain well controlled post-op. No questions or concerns at time of visit.

## 2024-03-22 NOTE — PROGRESS NOTES
Discharge instructions were reviewed with the patient. Opportunity for questions given. Patient verbalized understanding of discharge and follow up instructions, as well as S/S to report to MD or return to ER for. PIV was removed. Prescriptions provided. Patient will D/C to home.

## 2024-03-22 NOTE — PLAN OF CARE
Problem: Pain  Goal: Verbalizes/displays adequate comfort level or baseline comfort level  Outcome: Adequate for Discharge     Problem: Discharge Planning  Goal: Discharge to home or other facility with appropriate resources  Outcome: Adequate for Discharge

## 2024-03-22 NOTE — CARE COORDINATION
Pt is for discharge home today with family and no needs/supportive care orders recieved for CM at this time.       03/22/24 0907   Service Assessment   Patient's Healthcare Decision Maker is: Legal Next of Kin   Social/Functional History   Lives With Family   Type of Home House   Home Layout Two level   Home Access Level entry   Bathroom Equipment None   Home Equipment None   Receives Help From Family;Friend(s)   ADL Assistance Independent   Homemaking Assistance Independent   Homemaking Responsibilities Yes   Ambulation Assistance Independent   Transfer Assistance Independent   Active  Yes   Occupation Full time employment   Type of Occupation [employer]   Services At/After Discharge   Transition of Care Consult (CM Consult) Discharge Planning   Services At/After Discharge None   [place] Resource Information Provided? No   Mode of Transport at Discharge Other (see comment)  (car)   Confirm Follow Up Transport Family   Condition of Participation: Discharge Planning   The Plan for Transition of Care is related to the following treatment goals: The patient is to return home with family.   The Patient and/or Patient Representative was provided with a Choice of Provider? Patient   The Patient and/Or Patient Representative agree with the Discharge Plan? Yes   Freedom of Choice list was provided with basic dialogue that supports the patient's individualized plan of care/goals, treatment preferences, and shares the quality data associated with the providers?  Yes       Layne Jenkins, MSW, LBSW    [facility]

## 2024-04-01 NOTE — PROGRESS NOTES
Rahel presents for postop visit from  Total abdominal hysterectomy bilateral salpingectomies about 3 weeks ago.  Doing well postoperatively.without any bleeding.  Fever: NO Voiding well: YES.  Bowel movements OK: YES.   Doing great    Pt would like to work from home as she is out of PTO.    LMP 03/07/2024 (Exact Date)     Surgical pathology:    A:  \" UTERUS, BILATERAL TUBES\":         UTERINE LEIOMYOMA. BENIGN ENDOMETRIAL POLYP. SQUAMOUS EPITHELIUM   FOCALLY HAVING CHANGES CONSISTENT WITH CERVICAL INTRAEPITHELIAL NEOPLASIA,   II (MODERATE DYSPLASIA). ECTOCERVIX IS NEGATIVE FOR SIGNIFICANT DYSPLASIA.   NONSECRETORY ENDOMETRIUM     Exam: A&OX3, NAD.  Abdomen:  Non tender Incisions clean, dry, intact    A/P.  Stable Post op condition.  Gradually increase activity. Resumption of sexual activity is  encouraged at this time.  Follow up 3 weeks.  Wants to return to work from home

## 2024-04-10 ENCOUNTER — OFFICE VISIT (OUTPATIENT)
Dept: OBGYN CLINIC | Age: 39
End: 2024-04-10

## 2024-04-10 VITALS
BODY MASS INDEX: 34.7 KG/M2 | DIASTOLIC BLOOD PRESSURE: 80 MMHG | WEIGHT: 208.3 LBS | SYSTOLIC BLOOD PRESSURE: 122 MMHG | HEIGHT: 65 IN

## 2024-04-10 DIAGNOSIS — Z09 POSTOPERATIVE EXAMINATION: Primary | ICD-10-CM

## 2024-04-10 PROCEDURE — 99024 POSTOP FOLLOW-UP VISIT: CPT | Performed by: OBSTETRICS & GYNECOLOGY

## 2024-04-20 PROBLEM — Z01.818 PRE-OP TESTING: Status: RESOLVED | Noted: 2024-03-21 | Resolved: 2024-04-20

## 2024-04-29 NOTE — PROGRESS NOTES
Rahel presents for postop visit from  Total abdominal hysterectomy bilateral salpingectomies about 6 weeks ago.  Doing well postoperatively.without any bleeding.  Fever: NO Voiding well: YES.  Bowel movements OK: YES.   /74   Ht 1.651 m (5' 5\")   Wt 94.9 kg (209 lb 4.8 oz)   LMP 03/07/2024 (Exact Date)   BMI 34.83 kg/m²     Surgical pathology:    A:  \" UTERUS, BILATERAL TUBES\":         UTERINE LEIOMYOMA. BENIGN ENDOMETRIAL POLYP. SQUAMOUS EPITHELIUM   FOCALLY HAVING CHANGES CONSISTENT WITH CERVICAL INTRAEPITHELIAL NEOPLASIA,   II (MODERATE DYSPLASIA). ECTOCERVIX IS NEGATIVE FOR SIGNIFICANT DYSPLASIA.   NONSECRETORY ENDOMETRIUM     Exam: A&OX3, NAD.  Abdomen:  Non tender Incisions clean, dry, intact    A/P.  Stable Post op condition.  Gradually increase activity. Resumption of sexual activity is  encouraged at this time.  Follow up 12 weeks.

## 2024-05-02 ENCOUNTER — OFFICE VISIT (OUTPATIENT)
Dept: OBGYN CLINIC | Age: 39
End: 2024-05-02

## 2024-05-02 VITALS
DIASTOLIC BLOOD PRESSURE: 74 MMHG | SYSTOLIC BLOOD PRESSURE: 118 MMHG | WEIGHT: 209.3 LBS | HEIGHT: 65 IN | BODY MASS INDEX: 34.87 KG/M2

## 2024-05-02 DIAGNOSIS — Z09 POSTOPERATIVE EXAMINATION: Primary | ICD-10-CM

## 2024-05-02 PROCEDURE — 99024 POSTOP FOLLOW-UP VISIT: CPT | Performed by: OBSTETRICS & GYNECOLOGY

## 2024-06-06 ENCOUNTER — TELEPHONE (OUTPATIENT)
Dept: INTERNAL MEDICINE CLINIC | Facility: CLINIC | Age: 39
End: 2024-06-06

## 2024-06-06 NOTE — TELEPHONE ENCOUNTER
6/6/24 at 2:00 pm, patient missed their appointment. I called and couldn't reach but had to leave a voicemail message. No show letter being sent.

## (undated) DEVICE — SEALER TISS L20CM DIA13MM ADV BPLR L CRV JAW OPN APPRCH

## (undated) DEVICE — AMD ANTIMICROBIAL GAUZE SPONGES,12 PLY USP TYPE VII, 0.2% POLYHEXAMETHYLENE BIGUANIDE HCI (PHMB): Brand: CURITY

## (undated) DEVICE — SURGICAL PROCEDURE PACK C SECT CDS

## (undated) DEVICE — CANISTER, RIGID, 2000CC: Brand: MEDLINE INDUSTRIES, INC.

## (undated) DEVICE — 1LYRTR 16FR10ML 100%SILI SNAP: Brand: MEDLINE INDUSTRIES, INC.

## (undated) DEVICE — Device: Brand: PORTEX

## (undated) DEVICE — SOLUTION IV 1000ML 0.9% SOD CHL

## (undated) DEVICE — ELECTRODE BLDE L6.5IN CAUT EXT DISP

## (undated) DEVICE — SUTURE MCRYL SZ 4-0 L18IN ABSRB UD L19MM PS-2 3/8 CIR PRIM Y496G

## (undated) DEVICE — DRAPE TWL SURG 16X26IN BLU ORB04] ALLCARE INC]

## (undated) DEVICE — CATH FOL TY IC BAG 16FR 2000ML -- CONVERT TO ITEM 363158

## (undated) DEVICE — (D)PREP SKN CHLRAPRP APPL 26ML -- CONVERT TO ITEM 371833

## (undated) DEVICE — PENCIL ES L3M BTTN SWCH S STL HEX LOK BLDE ELECTRD HOLSTER

## (undated) DEVICE — STERILE POLYISOPRENE POWDER-FREE SURGICAL GLOVES: Brand: PROTEXIS

## (undated) DEVICE — CARDINAL HEALTH FLEXIBLE LIGHT HANDLE COVER: Brand: CARDINAL HEALTH

## (undated) DEVICE — GARMENT,MEDLINE,DVT,INT,CALF,MED, GEN2: Brand: MEDLINE

## (undated) DEVICE — REM POLYHESIVE ADULT PATIENT RETURN ELECTRODE: Brand: VALLEYLAB

## (undated) DEVICE — SUTURE MCRYL SZ 4-0 L27IN ABSRB UD L19MM PS-2 1/2 CIR PRIM Y426H

## (undated) DEVICE — SPONGE LAPAROTOMY W18XL18IN WHITE STRUNG RADIOPAQUE STERILE

## (undated) DEVICE — SHEET,DRAPE,53X77,STERILE: Brand: MEDLINE

## (undated) DEVICE — SUTURE CHROMIC GUT SZ 3-0 L27IN ABSRB BRN L26MM SH 1/2 CIR G122H

## (undated) DEVICE — SUTURE VCRL SZ 1 L27IN ABSRB UD CT-1 L36MM 1/2 CIR J261H

## (undated) DEVICE — SUTURE ABSORBABLE ANTIBACT 1-0 CT-1 24 IN STRATAFIX PDS + SXPP1A443

## (undated) DEVICE — SUT CHRMC 1 36IN CTX BRN --

## (undated) DEVICE — GAUZE,SPONGE,4"X4",16PLY,STRL,LF,10/TRAY: Brand: MEDLINE

## (undated) DEVICE — KENDALL SCD EXPRESS SLEEVES, KNEE LENGTH, MEDIUM: Brand: KENDALL SCD

## (undated) DEVICE — GLOVE SURG SZ 7 L11.33IN FNGR THK9.8MIL STRW LTX POLYMER

## (undated) DEVICE — SUTURE CHROMIC GUT SZ 0 L27IN ABSRB BRN L36MM CT-1 1/2 CIR 812H

## (undated) DEVICE — AMD ANTIMICROBIAL NON-ADHERENT PAD,0.2% POLYHEXAMETHYLENE BIGUANIDE HCI (PHMB): Brand: TELFA

## (undated) DEVICE — SOLUTION IRRIG 1000ML 0.9% SOD CHL USP POUR PLAS BTL

## (undated) DEVICE — SOLUTION IRRIG 1000ML H2O STRL BLT

## (undated) DEVICE — BASIC GYN: Brand: MEDLINE INDUSTRIES, INC.

## (undated) DEVICE — Z DISC USE 2220327 SUTURE ABSORBABLE BRAIDED 0 CT-1 8X27 IN UD VICRYL JJ41G

## (undated) DEVICE — SUTURE PLN GUT SZ 2-0 L27IN ABSRB YELLOWISH TAN L40MM CT 853H

## (undated) DEVICE — MEDI-VAC NON-CONDUCTIVE SUCTION TUBING: Brand: CARDINAL HEALTH

## (undated) DEVICE — SUTURE VCRL SZ 4-0 L27IN ABSRB UD L60MM KS STR REV CUT NDL J662H

## (undated) DEVICE — BASIC SINGLE BASIN-LF: Brand: MEDLINE INDUSTRIES, INC.

## (undated) DEVICE — LIQUIBAND RAPID ADHESIVE 36/CS 0.8ML: Brand: MEDLINE

## (undated) DEVICE — APPLICATOR MEDICATED 26 CC SOLUTION HI LT ORNG CHLORAPREP

## (undated) DEVICE — PAD,NON-ADHERENT,3X8,STERILE,LF,1/PK: Brand: MEDLINE